# Patient Record
Sex: FEMALE | Race: WHITE | Employment: OTHER | URBAN - METROPOLITAN AREA
[De-identification: names, ages, dates, MRNs, and addresses within clinical notes are randomized per-mention and may not be internally consistent; named-entity substitution may affect disease eponyms.]

---

## 2017-01-26 ENCOUNTER — OFFICE VISIT (OUTPATIENT)
Dept: WOUND CARE | Facility: HOSPITAL | Age: 82
End: 2017-01-26
Attending: NURSE PRACTITIONER
Payer: MEDICARE

## 2017-01-26 DIAGNOSIS — I87.332 CHRONIC VENOUS HYPERTENSION (IDIOPATHIC) WITH ULCER AND INFLAMMATION OF LEFT LOWER EXTREMITY (HCC): Primary | ICD-10-CM

## 2017-01-26 DIAGNOSIS — L97.929 CHRONIC VENOUS HYPERTENSION (IDIOPATHIC) WITH ULCER AND INFLAMMATION OF LEFT LOWER EXTREMITY (HCC): Primary | ICD-10-CM

## 2017-01-26 DIAGNOSIS — L97.322 NON-PRESSURE CHRONIC ULCER OF LEFT ANKLE WITH FAT LAYER EXPOSED (HCC): ICD-10-CM

## 2017-01-26 PROCEDURE — 29581 APPL MULTLAYER CMPRN SYS LEG: CPT

## 2017-01-26 PROCEDURE — 99214 OFFICE O/P EST MOD 30 MIN: CPT

## 2017-01-26 NOTE — PROGRESS NOTES
Chief Complaint  This information was obtained from the patient  Patient presents for initial visit. Discovered wound. Not sure when. Has been seen by a couple doctors and is putting a salve on (not sure of name) and a bandage daily but today was only day. Constitutional Symptoms (General Health):  Fatigue, Fever, Loss of Appetite, Marked Weight Change, Night Sweats, Chills  Respiratory: Cough, Shortness of Breath  Cardiovascular (Central/Peripheral): Chest Pain, Dyspnea on Exertion, Edema, Intermittent Alexei Heart rhythm and rate regular, without murmur or gallop. dp/pt palpable bilaterally, strong pulsatile signals the the dp/pt/distal hallux bilaterally. Extremities + for varicosities, hemosideran staining L>R, atrophie shanti on the left, minimal edema.  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 pcp is dr. Giovanny Womack Protein: Meats, beans, eggs, milk and yogurt particularly [place] yogurt), tofu, soy nuts, soy protein products  Vitamin C: Citrus fruits and juices, strawberries, tomatoes, tomato juice, peppers, baked potatoes, spinach, broccoli, cauliflower, [place] spro

## 2017-01-30 ENCOUNTER — HOSPITAL ENCOUNTER (OUTPATIENT)
Dept: CARDIOLOGY CLINIC | Facility: HOSPITAL | Age: 82
Discharge: HOME | End: 2017-01-30
Attending: NURSE PRACTITIONER
Payer: MEDICARE

## 2017-01-30 ENCOUNTER — OFFICE VISIT (OUTPATIENT)
Dept: WOUND CARE | Facility: HOSPITAL | Age: 82
End: 2017-01-30
Attending: NURSE PRACTITIONER
Payer: MEDICARE

## 2017-01-30 DIAGNOSIS — L97.929 CHRONIC VENOUS HYPERTENSION (IDIOPATHIC) WITH ULCER AND INFLAMMATION OF LEFT LOWER EXTREMITY (HCC): ICD-10-CM

## 2017-01-30 DIAGNOSIS — I87.312 IDIOPATHIC CHRONIC VENOUS HYPERTENSION OF LEFT LOWER EXTREMITY WITH ULCER (HCC): ICD-10-CM

## 2017-01-30 DIAGNOSIS — I73.9 PERIPHERAL VASCULAR DISEASE, UNSPECIFIED (HCC): ICD-10-CM

## 2017-01-30 DIAGNOSIS — I73.9 PERIPHERAL VASCULAR DISEASE, UNSPECIFIED (HCC): Primary | ICD-10-CM

## 2017-01-30 DIAGNOSIS — L97.929 IDIOPATHIC CHRONIC VENOUS HYPERTENSION OF LEFT LOWER EXTREMITY WITH ULCER (HCC): ICD-10-CM

## 2017-01-30 DIAGNOSIS — I87.332 CHRONIC VENOUS HYPERTENSION (IDIOPATHIC) WITH ULCER AND INFLAMMATION OF LEFT LOWER EXTREMITY (HCC): ICD-10-CM

## 2017-01-30 PROCEDURE — 93970 EXTREMITY STUDY: CPT

## 2017-01-30 PROCEDURE — 29581 APPL MULTLAYER CMPRN SYS LEG: CPT

## 2017-02-01 ENCOUNTER — OFFICE VISIT (OUTPATIENT)
Dept: WOUND CARE | Age: 82
End: 2017-02-01
Attending: NURSE PRACTITIONER
Payer: MEDICARE

## 2017-02-01 DIAGNOSIS — L97.929 IDIOPATHIC CHRONIC VENOUS HYPERTENSION OF LEFT LOWER EXTREMITY WITH ULCER (HCC): ICD-10-CM

## 2017-02-01 DIAGNOSIS — L97.929 CHRONIC VENOUS HYPERTENSION (IDIOPATHIC) WITH ULCER AND INFLAMMATION OF LEFT LOWER EXTREMITY (HCC): ICD-10-CM

## 2017-02-01 DIAGNOSIS — I87.332 CHRONIC VENOUS HYPERTENSION (IDIOPATHIC) WITH ULCER AND INFLAMMATION OF LEFT LOWER EXTREMITY (HCC): ICD-10-CM

## 2017-02-01 DIAGNOSIS — I87.312 IDIOPATHIC CHRONIC VENOUS HYPERTENSION OF LEFT LOWER EXTREMITY WITH ULCER (HCC): ICD-10-CM

## 2017-02-01 DIAGNOSIS — I73.9 PERIPHERAL VASCULAR DISEASE, UNSPECIFIED (HCC): Primary | ICD-10-CM

## 2017-02-01 PROCEDURE — 29581 APPL MULTLAYER CMPRN SYS LEG: CPT

## 2017-02-01 NOTE — PROGRESS NOTES
Chief Complaint  This information was obtained from the patient  Patient is here for a follow up. No concerns or complaints at this time. Patient pain level is 6/10    General Notes:  Patint came into the wound center with her wrap off.  Patient only had un Cardiovascular (Central/Peripheral): Chest Pain, Dyspnea on Exertion, Edema, Intermittent Claudication  Gastrointestinal (GI): Bowel Incontinence, Change in Bowel Habits, Constipation  Musculoskeletal: Contractures, Assistive Devices  Integumentary (Hair/S Wound #1 Left, Medial Foot is a Full Thickness Venous Ulcer and has received a status of Not Healed. Subsequent wound encounter measurements are 1.6cm length x 2.1cm width x 0.1cm depth, with an area of 3.36 sq cm and a volume of 0.336 cubic cm.  No tunneli May shower with protection.  - with each dressing and compression wrap change cleanse the limb, foot, and between toes with soap and water and dry thoroughly  Cleanse with Vashe  Steroid cream/ointment - betamethasone around the wound  Zinc Barrier ointment Follow-Up Appointments:  A follow-up appointment should be scheduled. discussed the results of study with patient and gave her copy.  explained taht we will continue tx the wound but she needs to f/u with vascular-number given, update given to vascul

## 2017-02-03 ENCOUNTER — OFFICE VISIT (OUTPATIENT)
Dept: WOUND CARE | Facility: HOSPITAL | Age: 82
End: 2017-02-03
Attending: NURSE PRACTITIONER
Payer: MEDICARE

## 2017-02-03 DIAGNOSIS — L97.929 IDIOPATHIC CHRONIC VENOUS HYPERTENSION OF LEFT LOWER EXTREMITY WITH ULCER (HCC): ICD-10-CM

## 2017-02-03 DIAGNOSIS — I87.312 IDIOPATHIC CHRONIC VENOUS HYPERTENSION OF LEFT LOWER EXTREMITY WITH ULCER (HCC): ICD-10-CM

## 2017-02-03 DIAGNOSIS — I73.9 PERIPHERAL VASCULAR DISEASE, UNSPECIFIED (HCC): Primary | ICD-10-CM

## 2017-02-03 DIAGNOSIS — I87.332 CHRONIC VENOUS HYPERTENSION (IDIOPATHIC) WITH ULCER AND INFLAMMATION OF LEFT LOWER EXTREMITY (HCC): ICD-10-CM

## 2017-02-03 DIAGNOSIS — L97.929 CHRONIC VENOUS HYPERTENSION (IDIOPATHIC) WITH ULCER AND INFLAMMATION OF LEFT LOWER EXTREMITY (HCC): ICD-10-CM

## 2017-02-03 PROCEDURE — 29581 APPL MULTLAYER CMPRN SYS LEG: CPT

## 2017-02-09 ENCOUNTER — OFFICE VISIT (OUTPATIENT)
Dept: WOUND CARE | Facility: HOSPITAL | Age: 82
End: 2017-02-09
Attending: NURSE PRACTITIONER
Payer: MEDICARE

## 2017-02-09 DIAGNOSIS — I87.312 IDIOPATHIC CHRONIC VENOUS HYPERTENSION OF LEFT LOWER EXTREMITY WITH ULCER (HCC): ICD-10-CM

## 2017-02-09 DIAGNOSIS — I87.332 CHRONIC VENOUS HYPERTENSION (IDIOPATHIC) WITH ULCER AND INFLAMMATION OF LEFT LOWER EXTREMITY (HCC): ICD-10-CM

## 2017-02-09 DIAGNOSIS — I73.9 PERIPHERAL VASCULAR DISEASE, UNSPECIFIED (HCC): Primary | ICD-10-CM

## 2017-02-09 DIAGNOSIS — L97.929 CHRONIC VENOUS HYPERTENSION (IDIOPATHIC) WITH ULCER AND INFLAMMATION OF LEFT LOWER EXTREMITY (HCC): ICD-10-CM

## 2017-02-09 DIAGNOSIS — L97.929 IDIOPATHIC CHRONIC VENOUS HYPERTENSION OF LEFT LOWER EXTREMITY WITH ULCER (HCC): ICD-10-CM

## 2017-02-09 PROCEDURE — 29581 APPL MULTLAYER CMPRN SYS LEG: CPT

## 2017-02-09 PROCEDURE — 97602 WOUND(S) CARE NON-SELECTIVE: CPT

## 2017-02-09 NOTE — PROGRESS NOTES
Chief Complaint  This information was obtained from the patient  Patient here for wound care f/u. Patient has 5/10 for pain.      Allergies  Dexilant (Reaction: diarrhea)    HPI  This information was obtained from the patient  INITIAL 1-26-17 patient is a 9 Psychiatric: Anxiety, Claustrophobia, Memory Loss, Depression, Mental Illness    Weekly Nutrition Assessment    Weekly Nutrition Assessment  Does Patient describe adequate dietary intake?: Yes  4 servings protein daily?: Yes  5 servings fruit/veg daily? : The periwound skin exhibited: Moist, Atrophie Jocelyn, Hemosiderosis. The periwound skin did not exhibit: Edema, Dry/Scaly. The temperature of the periwound skin is Warm. Periwound skin does not exhibit signs or symptoms of infection.  Local Pulse is Normal Hydrofera ready  ABD pad  Moisturizer to surrounding skin.   Change dressing every: - twice weekly (if patient is able to get to clinic) for the first week, then weekly    Compression Therapy:  Coflex calamine unna boot  Avoid prolonged standing in one plac no progress at this time, will switch to santyl, awaiting venous px with dr. Seferino Wood.  will order compression stockings so that patient potentially can care for her wound indepdently until venous px as patient has to depend on family for visits to wound cent

## 2017-02-13 ENCOUNTER — OFFICE VISIT (OUTPATIENT)
Dept: WOUND CARE | Facility: HOSPITAL | Age: 82
End: 2017-02-13
Attending: NURSE PRACTITIONER
Payer: MEDICARE

## 2017-02-13 DIAGNOSIS — L97.929 CHRONIC VENOUS HYPERTENSION (IDIOPATHIC) WITH ULCER AND INFLAMMATION OF LEFT LOWER EXTREMITY (HCC): ICD-10-CM

## 2017-02-13 DIAGNOSIS — I87.332 CHRONIC VENOUS HYPERTENSION (IDIOPATHIC) WITH ULCER AND INFLAMMATION OF LEFT LOWER EXTREMITY (HCC): ICD-10-CM

## 2017-02-13 DIAGNOSIS — L97.929 IDIOPATHIC CHRONIC VENOUS HYPERTENSION OF LEFT LOWER EXTREMITY WITH ULCER (HCC): ICD-10-CM

## 2017-02-13 DIAGNOSIS — I73.9 PERIPHERAL VASCULAR DISEASE, UNSPECIFIED (HCC): Primary | ICD-10-CM

## 2017-02-13 DIAGNOSIS — I87.312 IDIOPATHIC CHRONIC VENOUS HYPERTENSION OF LEFT LOWER EXTREMITY WITH ULCER (HCC): ICD-10-CM

## 2017-02-13 PROCEDURE — 29581 APPL MULTLAYER CMPRN SYS LEG: CPT

## 2017-02-13 PROCEDURE — 97602 WOUND(S) CARE NON-SELECTIVE: CPT

## 2017-02-16 ENCOUNTER — OFFICE VISIT (OUTPATIENT)
Dept: WOUND CARE | Facility: HOSPITAL | Age: 82
End: 2017-02-16
Attending: NURSE PRACTITIONER
Payer: MEDICARE

## 2017-02-16 DIAGNOSIS — I87.332 CHRONIC VENOUS HYPERTENSION (IDIOPATHIC) WITH ULCER AND INFLAMMATION OF LEFT LOWER EXTREMITY (HCC): ICD-10-CM

## 2017-02-16 DIAGNOSIS — I87.312 IDIOPATHIC CHRONIC VENOUS HYPERTENSION OF LEFT LOWER EXTREMITY WITH ULCER (HCC): ICD-10-CM

## 2017-02-16 DIAGNOSIS — L97.929 CHRONIC VENOUS HYPERTENSION (IDIOPATHIC) WITH ULCER AND INFLAMMATION OF LEFT LOWER EXTREMITY (HCC): ICD-10-CM

## 2017-02-16 DIAGNOSIS — L97.929 IDIOPATHIC CHRONIC VENOUS HYPERTENSION OF LEFT LOWER EXTREMITY WITH ULCER (HCC): ICD-10-CM

## 2017-02-16 DIAGNOSIS — I73.9 PERIPHERAL VASCULAR DISEASE, UNSPECIFIED (HCC): Primary | ICD-10-CM

## 2017-02-16 PROCEDURE — 29581 APPL MULTLAYER CMPRN SYS LEG: CPT

## 2017-02-17 NOTE — PROGRESS NOTES
Chief Complaint  This information was obtained from the patient  Pt arrives for follow up care to left foot venous ulcer. Pt states wrap is too tight waking her up at night. She states she is not able to ever elevate her leg above her heart d/t back pain. Musculoskeletal: Joint Pain (osteoarthritis), Backache (kyphosis with neck pain)  Integumentary (Hair/Skin/Nails): Hyperpigmentation, Ulcers, Hemosiderin Staining  Psychiatric: Nervousness / Tension    Patient denies complaints or symptoms related to:    Co see wound documentation. Skin and subcutaneous tissue without induration, bogginess, errythema or crepitus . Wound #1 Left, Medial Foot is a Full Thickness Venous Ulcer and has received a status of Not Healed.  Subsequent wound encounter measurements ar Wound #1 (Venous Ulcer) is located on the left, medial foot. A Multi-Layer Compression Wrap procedure was performed by Francella Peabody, MA. A 2 Layers Unna Boot was applied with moderate 15-25 mmhg. The procedure was tolerated well.    General Notes:  unna 2) bring your stockings that you have to your next appointment and we will assess them and see if they will be appropriate    Care summary  Wound type: - venous  Wound no change. No s/s of infection. Perfusion assessed by ESTRELLITA/TBI - bedside-l=. 92 and righ

## 2017-02-20 ENCOUNTER — OFFICE VISIT (OUTPATIENT)
Dept: WOUND CARE | Facility: HOSPITAL | Age: 82
End: 2017-02-20
Attending: NURSE PRACTITIONER
Payer: MEDICARE

## 2017-02-20 DIAGNOSIS — I73.9 PERIPHERAL VASCULAR DISEASE, UNSPECIFIED (HCC): ICD-10-CM

## 2017-02-20 DIAGNOSIS — I87.312 IDIOPATHIC CHRONIC VENOUS HYPERTENSION OF LEFT LOWER EXTREMITY WITH ULCER (HCC): Primary | ICD-10-CM

## 2017-02-20 DIAGNOSIS — L97.929 IDIOPATHIC CHRONIC VENOUS HYPERTENSION OF LEFT LOWER EXTREMITY WITH ULCER (HCC): Primary | ICD-10-CM

## 2017-02-20 PROCEDURE — 29581 APPL MULTLAYER CMPRN SYS LEG: CPT

## 2017-02-23 ENCOUNTER — HOSPITAL ENCOUNTER (EMERGENCY)
Facility: HOSPITAL | Age: 82
Discharge: HOME | End: 2017-02-23
Attending: EMERGENCY MEDICINE
Payer: MEDICARE

## 2017-02-23 ENCOUNTER — OFFICE VISIT (OUTPATIENT)
Dept: WOUND CARE | Facility: HOSPITAL | Age: 82
End: 2017-02-23
Attending: NURSE PRACTITIONER
Payer: MEDICARE

## 2017-02-23 VITALS
RESPIRATION RATE: 16 BRPM | DIASTOLIC BLOOD PRESSURE: 79 MMHG | SYSTOLIC BLOOD PRESSURE: 171 MMHG | HEART RATE: 71 BPM | TEMPERATURE: 99 F | OXYGEN SATURATION: 99 % | BODY MASS INDEX: 18.55 KG/M2 | WEIGHT: 92 LBS | HEIGHT: 59 IN

## 2017-02-23 DIAGNOSIS — I73.9 PERIPHERAL VASCULAR DISEASE, UNSPECIFIED (HCC): ICD-10-CM

## 2017-02-23 DIAGNOSIS — I87.332 CHRONIC VENOUS HYPERTENSION (IDIOPATHIC) WITH ULCER AND INFLAMMATION OF LEFT LOWER EXTREMITY (HCC): ICD-10-CM

## 2017-02-23 DIAGNOSIS — L97.929 IDIOPATHIC CHRONIC VENOUS HYPERTENSION OF LEFT LOWER EXTREMITY WITH ULCER (HCC): Primary | ICD-10-CM

## 2017-02-23 DIAGNOSIS — I10 ESSENTIAL HYPERTENSION: Primary | ICD-10-CM

## 2017-02-23 DIAGNOSIS — L97.929 CHRONIC VENOUS HYPERTENSION (IDIOPATHIC) WITH ULCER AND INFLAMMATION OF LEFT LOWER EXTREMITY (HCC): ICD-10-CM

## 2017-02-23 DIAGNOSIS — I87.312 IDIOPATHIC CHRONIC VENOUS HYPERTENSION OF LEFT LOWER EXTREMITY WITH ULCER (HCC): Primary | ICD-10-CM

## 2017-02-23 LAB
ATRIAL RATE: 79 BPM
P AXIS: 93 DEGREES
P-R INTERVAL: 242 MS
Q-T INTERVAL: 406 MS
QRS DURATION: 80 MS
QTC CALCULATION (BEZET): 465 MS
R AXIS: -11 DEGREES
T AXIS: 73 DEGREES
VENTRICULAR RATE: 79 BPM

## 2017-02-23 PROCEDURE — 99284 EMERGENCY DEPT VISIT MOD MDM: CPT

## 2017-02-23 PROCEDURE — 93010 ELECTROCARDIOGRAM REPORT: CPT

## 2017-02-23 PROCEDURE — 99213 OFFICE O/P EST LOW 20 MIN: CPT

## 2017-02-23 PROCEDURE — 29581 APPL MULTLAYER CMPRN SYS LEG: CPT

## 2017-02-23 PROCEDURE — 93005 ELECTROCARDIOGRAM TRACING: CPT

## 2017-02-23 RX ORDER — AMLODIPINE BESYLATE 5 MG/1
5 TABLET ORAL ONCE
Status: COMPLETED | OUTPATIENT
Start: 2017-02-23 | End: 2017-02-23

## 2017-02-23 RX ORDER — AMLODIPINE BESYLATE 2.5 MG/1
2.5 TABLET ORAL DAILY
Qty: 30 TABLET | Refills: 0 | Status: SHIPPED | OUTPATIENT
Start: 2017-02-23 | End: 2017-03-07

## 2017-02-23 NOTE — PROGRESS NOTES
Chief Complaint  This information was obtained from the patient  Pt is here for follow up care to left foot venous ulcer. Pt complains of headache since this morning. Denies pain on the wound.     Allergies  Dexilant (Reaction: diarrhea)    HPI  This informa Psychiatric: Nervousness / Tension    Patient denies complaints or symptoms related to:   Constitutional Symptoms (General Health):  Fatigue, Fever, Loss of Appetite, Marked Weight Change, Night Sweats, Chills  Respiratory: Cough, Shortness of Breath  Cardi see wound documentation. Skin and subcutaneous tissue without induration, bogginess, errythema or crepitus . Wound #1 Left, Medial Foot is a Full Thickness Venous Ulcer and has received a status of Not Healed.  Subsequent wound encounter measurements ar I10: Essential (primary) hypertension  General Notes:  I spent 25 minutes with the patient, I personally checked patient in, measured the wound, and discussed transportation to ED. See plan.  More than 50% of that time was face to face and spent counseling Other: - bring your stockings that you have to your next appointment and to Dr. Faisal Swain office and we will assess them and see if they will be appropriate    Care summary  Wound type: - venous  Wound no change. No s/s of infection.    Perfusion assessed by A

## 2017-02-23 NOTE — ED NOTES
Pt appears comfortable on cart. Pt informed she is ready for discharge. Pt to call son for ride home.

## 2017-02-23 NOTE — ED PROVIDER NOTES
Patient Seen in: [facility] Emergency Department    History   Patient presents with:  Hypertension (cardiovascular)    Stated Complaint: hypertension    HPI    Patient is a 80-year-old female comes in emergency room for evaluation of elevated blood Apply to rectal area twice daily as needed   Cholecalciferol (VITAMIN D3) 1000 UNIT Oral Tab,  1 TABLET DAILY   CALCIUM + D 600-200 MG-UNIT OR TABS,  2 TABLETS DAILY       No family history on file.       Smoking Status: Never Smoker                      Sm intact all groups. normal sensation, speech intact    ED Course     Labs Reviewed   RAINBOW DRAW BLUE   RAINBOW DRAW GOLD   RAINBOW DRAW LAVENDER   RAINBOW DRAW LIGHT GREEN      EKG    Rate, intervals and axes as noted on EKG Report.   Rate: 79  Rhythm: Si

## 2017-02-23 NOTE — ED INITIAL ASSESSMENT (HPI)
Sent from wound clinic for hypertension. Patient reports she does not take any medication for BP. Patient states she noticed headache this morning over right eye. Has not taken anything for headache.

## 2017-02-28 ENCOUNTER — OFFICE VISIT (OUTPATIENT)
Dept: WOUND CARE | Facility: HOSPITAL | Age: 82
End: 2017-02-28
Attending: NURSE PRACTITIONER
Payer: MEDICARE

## 2017-02-28 DIAGNOSIS — I87.332 CHRONIC VENOUS HYPERTENSION (IDIOPATHIC) WITH ULCER AND INFLAMMATION OF LEFT LOWER EXTREMITY (HCC): Primary | ICD-10-CM

## 2017-02-28 DIAGNOSIS — L97.929 CHRONIC VENOUS HYPERTENSION (IDIOPATHIC) WITH ULCER AND INFLAMMATION OF LEFT LOWER EXTREMITY (HCC): Primary | ICD-10-CM

## 2017-02-28 DIAGNOSIS — L97.322 NON-PRESSURE CHRONIC ULCER OF LEFT ANKLE WITH FAT LAYER EXPOSED (HCC): ICD-10-CM

## 2017-02-28 PROCEDURE — 29581 APPL MULTLAYER CMPRN SYS LEG: CPT

## 2017-03-02 ENCOUNTER — OFFICE VISIT (OUTPATIENT)
Dept: WOUND CARE | Facility: HOSPITAL | Age: 82
End: 2017-03-02
Attending: NURSE PRACTITIONER
Payer: MEDICARE

## 2017-03-02 PROCEDURE — 29581 APPL MULTLAYER CMPRN SYS LEG: CPT

## 2017-03-02 NOTE — PROGRESS NOTES
Chief Complaint  This information was obtained from the patient  Patient is here for left foot venous ulcer. She went to see Dr. Birdie Kelley, who was late for appointment, he did not do procedure because she did not have her compression stockings with her.  She Psychiatric: Memory Loss (patient is forgetting things that we have told her and written on her dc orders she also is claiming she did not get any injection at the dr. Zac Tapia visit.), Nervousness / Tension    Patient denies complaints or symptoms related t dp/pt palpable left. left lower extremity + for varicosities, hemosideran staining L>R, atrophie shanti on the left, minimal edema. Capillary refill < 3 seconds. Digits are warm.  toenails are wnl for color and hygeine, slightly thickned and long. + sparse (Encounter Diagnosis) I10 - Essential (primary) hypertension    Diagnoses    ICD-10  I87.332: Chronic venous hypertension (idiopathic) with ulcer and inflammation of left lower extremity  L97.322: Non-pressure chronic ulcer of left ankle with fat layer exp Vitamin C: Citrus fruits and juices, strawberries, tomatoes, tomato juice, peppers, baked potatoes, spinach, broccoli, cauliflower, [place] sprouts, cabbage  Vitamin A: Dark green, leafy vegetables, orange or yellow vegetables, cantaloupe, fortified dairy 3) they are requesting the records to be faxed to [facility] which we will do.   4) since the daughter \"came for 2 weeks from [facility] [place] [facility] her mother\" I am recommending that the daughter please assist and transport the patient to a PCP specifically regardin

## 2017-03-07 PROBLEM — I10 ESSENTIAL HYPERTENSION: Status: ACTIVE | Noted: 2017-03-07

## 2017-03-07 PROCEDURE — 36415 COLL VENOUS BLD VENIPUNCTURE: CPT | Performed by: FAMILY MEDICINE

## 2017-03-07 PROCEDURE — 84443 ASSAY THYROID STIM HORMONE: CPT | Performed by: FAMILY MEDICINE

## 2017-03-09 ENCOUNTER — OFFICE VISIT (OUTPATIENT)
Dept: WOUND CARE | Facility: HOSPITAL | Age: 82
End: 2017-03-09
Attending: NURSE PRACTITIONER
Payer: MEDICARE

## 2017-03-09 DIAGNOSIS — I87.312 IDIOPATHIC CHRONIC VENOUS HYPERTENSION OF LEFT LOWER EXTREMITY WITH ULCER (HCC): ICD-10-CM

## 2017-03-09 DIAGNOSIS — L97.929 IDIOPATHIC CHRONIC VENOUS HYPERTENSION OF LEFT LOWER EXTREMITY WITH ULCER (HCC): ICD-10-CM

## 2017-03-09 DIAGNOSIS — I87.332 CHRONIC VENOUS HYPERTENSION (IDIOPATHIC) WITH ULCER AND INFLAMMATION OF LEFT LOWER EXTREMITY (HCC): Primary | ICD-10-CM

## 2017-03-09 DIAGNOSIS — L97.322 NON-PRESSURE CHRONIC ULCER OF LEFT ANKLE WITH FAT LAYER EXPOSED (HCC): ICD-10-CM

## 2017-03-09 DIAGNOSIS — L97.929 CHRONIC VENOUS HYPERTENSION (IDIOPATHIC) WITH ULCER AND INFLAMMATION OF LEFT LOWER EXTREMITY (HCC): Primary | ICD-10-CM

## 2017-03-09 PROCEDURE — 29581 APPL MULTLAYER CMPRN SYS LEG: CPT

## 2017-03-09 NOTE — PROGRESS NOTES
Header Image    Progress Note Details  Patient Name: Bridgette Jones   Patient Number: [id number]  Patient YOB: 1924  Date: 3/9/2017  Physician / Brian Bank: Jesus Manuel Her, [address]: [facility]    Chief Complaint  This information was 3/9/17-Pt follows up with PCP for high blood pressure. Will continue same dressing at this time and patient will continue to follow up with  . Appointment at [facility] cancelled per daughter.     Complaints and Symptoms  This information was obtained fr bp elevated, patient denies symptoms of headache, dizziness, sob, or vision changes, instructed to check at home. will continue to monitor. Pulse Regular and wnl for patient. Jose Ramon Garcia Respirations easy and unlabored. Temperature wnl. Weight normal for height. Melons  Perla wound bed edges are difficult to assess due to the atrophie shanti surrounding the wound    Psychiatric:  judgement and insight is intact, however assistance by family for medical decision-making. Alert and oriented times 3.  No evidence of depression, anx Increase dietary protein - focus on the following foods:  Protein: Meats, beans, eggs, milk and yogurt particularly [place] yogurt), tofu, soy nuts, soy protein products  Vitamin C: Citrus fruits and juices, strawberries, tomatoes, tomato juice, peppers, [place]

## 2017-03-16 ENCOUNTER — OFFICE VISIT (OUTPATIENT)
Dept: WOUND CARE | Facility: HOSPITAL | Age: 82
End: 2017-03-16
Attending: NURSE PRACTITIONER
Payer: MEDICARE

## 2017-03-16 DIAGNOSIS — L97.322 NON-PRESSURE CHRONIC ULCER OF LEFT ANKLE WITH FAT LAYER EXPOSED (HCC): ICD-10-CM

## 2017-03-16 DIAGNOSIS — L97.929 IDIOPATHIC CHRONIC VENOUS HYPERTENSION OF LEFT LOWER EXTREMITY WITH ULCER (HCC): ICD-10-CM

## 2017-03-16 DIAGNOSIS — L97.929 CHRONIC VENOUS HYPERTENSION (IDIOPATHIC) WITH ULCER AND INFLAMMATION OF LEFT LOWER EXTREMITY (HCC): Primary | ICD-10-CM

## 2017-03-16 DIAGNOSIS — I87.312 IDIOPATHIC CHRONIC VENOUS HYPERTENSION OF LEFT LOWER EXTREMITY WITH ULCER (HCC): ICD-10-CM

## 2017-03-16 DIAGNOSIS — I87.332 CHRONIC VENOUS HYPERTENSION (IDIOPATHIC) WITH ULCER AND INFLAMMATION OF LEFT LOWER EXTREMITY (HCC): Primary | ICD-10-CM

## 2017-03-16 PROCEDURE — 29581 APPL MULTLAYER CMPRN SYS LEG: CPT

## 2017-03-16 NOTE — PROGRESS NOTES
&   Chief Complaint  This information was obtained from the patient  Patient is here for a wound care follow up with left foot wound. She denies any pain.     Allergies  Dexilant (Reaction: diarrhea)    HPI  This information was obtained from the patient  I Constitutional Symptoms (General Health):  Fatigue, Fever, Loss of Appetite, Marked Weight Change, Night Sweats, Chills  Respiratory: Cough, Shortness of Breath  Cardiovascular (Central/Peripheral): Chest Pain, Dyspnea on Exertion, Edema  Musculoskeletal: C Wound #1 Left, Medial Foot is a Full Thickness Venous Ulcer and has received a status of Not Healed. Subsequent wound encounter measurements are 2.3cm length x 1.4cm width x 0.1cm depth, with an area of 3.22 sq cm and a volume of 0.322 cubic cm.  No tunneli Wound #1 Left, Medial Foot     Wound Cleansing & Dressings  May shower with protection.  - with each dressing and compression wrap change cleanse the limb, foot, and between toes with soap and water and dry thoroughly  Steroid cream/ointment - betamethasone Electronic Signature(s)   Signed By:  Date:    Lisa SIEGEL, ÁNGELA-AP, CFCN, [facility] 03/16/2017 09:26:09        Entered By: Lisa Hill on 03/16/2017 09:25:52

## 2017-03-20 ENCOUNTER — OFFICE VISIT (OUTPATIENT)
Dept: WOUND CARE | Facility: HOSPITAL | Age: 82
End: 2017-03-20
Attending: NURSE PRACTITIONER
Payer: MEDICARE

## 2017-03-20 DIAGNOSIS — I73.9 PERIPHERAL VASCULAR DISEASE, UNSPECIFIED (HCC): ICD-10-CM

## 2017-03-20 DIAGNOSIS — L97.322 NON-PRESSURE CHRONIC ULCER OF LEFT ANKLE WITH FAT LAYER EXPOSED (HCC): ICD-10-CM

## 2017-03-20 DIAGNOSIS — L97.929 IDIOPATHIC CHRONIC VENOUS HYPERTENSION OF LEFT LOWER EXTREMITY WITH ULCER (HCC): ICD-10-CM

## 2017-03-20 DIAGNOSIS — I87.312 IDIOPATHIC CHRONIC VENOUS HYPERTENSION OF LEFT LOWER EXTREMITY WITH ULCER (HCC): ICD-10-CM

## 2017-03-20 DIAGNOSIS — I87.332 CHRONIC VENOUS HYPERTENSION (IDIOPATHIC) WITH ULCER AND INFLAMMATION OF LEFT LOWER EXTREMITY (HCC): Primary | ICD-10-CM

## 2017-03-20 DIAGNOSIS — L97.929 CHRONIC VENOUS HYPERTENSION (IDIOPATHIC) WITH ULCER AND INFLAMMATION OF LEFT LOWER EXTREMITY (HCC): Primary | ICD-10-CM

## 2017-03-20 PROCEDURE — 29581 APPL MULTLAYER CMPRN SYS LEG: CPT

## 2017-03-20 NOTE — PROGRESS NOTES
&   Chief Complaint  This information was obtained from the patient  Patient is here for a wound care follow up with left foot wound states she noted bleeding on her sheets yesterday. She was seen by a [facility] and dressing was not removed.      Allergies  Dexil (kyphosis with neck pain)  Integumentary (Hair/Skin/Nails): Ulcers, Hemosiderin Staining  Psychiatric: Nervousness / Tension  Prior Wound History: Bleeding (over the weekend)    Patient denies complaints or symptoms related to:   Constitutional Symptoms (G Venous Ulcer and has received a status of Not Healed. Subsequent wound encounter measurements are 2.7cm length x 2.1cm width x 0.2cm depth, with an area of 5.67 sq cm and a volume of 1.134 cubic cm. No tunneling has been noted.  No sinus tract has been note was applied with high 30-40 mmhg. The procedure was tolerated well. General Notes:  coflex 2 layer        Plan    Wound Orders:  Wound #1 Left, Medial Foot     Wound Cleansing & Dressings  May shower with protection.  - with each dressing and compression visualize where she was \"bleeding from\" from the weekend. patient has a f/u appt with dr. Lesa Mendez either this week or next. I will see her later this week.    Electronic Signature(s)   Signed By:  Date:    Geraldo SIEGEL, CWRAUL-AP, [facility], [address] [place] [address],[address], [facility] 03/

## 2017-03-23 ENCOUNTER — OFFICE VISIT (OUTPATIENT)
Dept: WOUND CARE | Facility: HOSPITAL | Age: 82
End: 2017-03-23
Attending: NURSE PRACTITIONER
Payer: MEDICARE

## 2017-03-23 DIAGNOSIS — L97.929 CHRONIC VENOUS HYPERTENSION (IDIOPATHIC) WITH ULCER AND INFLAMMATION OF LEFT LOWER EXTREMITY (HCC): ICD-10-CM

## 2017-03-23 DIAGNOSIS — I87.332 CHRONIC VENOUS HYPERTENSION (IDIOPATHIC) WITH ULCER AND INFLAMMATION OF LEFT LOWER EXTREMITY (HCC): ICD-10-CM

## 2017-03-23 DIAGNOSIS — T14.8XXA NONHEALING NONSURGICAL WOUND: Primary | ICD-10-CM

## 2017-03-23 DIAGNOSIS — L97.322 NON-PRESSURE CHRONIC ULCER OF LEFT ANKLE WITH FAT LAYER EXPOSED (HCC): ICD-10-CM

## 2017-03-23 DIAGNOSIS — I73.9 PERIPHERAL VASCULAR DISEASE, UNSPECIFIED (HCC): ICD-10-CM

## 2017-03-23 PROCEDURE — 87070 CULTURE OTHR SPECIMN AEROBIC: CPT

## 2017-03-23 PROCEDURE — 29581 APPL MULTLAYER CMPRN SYS LEG: CPT

## 2017-03-23 PROCEDURE — 87205 SMEAR GRAM STAIN: CPT

## 2017-03-23 NOTE — PROGRESS NOTES
&   Chief Complaint  This information was obtained from the patient  Patient is here for a wound care follow up on left foot no concerns    Allergies  Dexilant (Reaction: diarrhea)    HPI  This information was obtained from the patient  INITIAL 1-26-17 pat Constitutional Symptoms (General Health):  Fatigue, Fever, Loss of Appetite, Marked Weight Change, Night Sweats, Chills  Respiratory: Cough, Shortness of Breath  Cardiovascular (Central/Peripheral): Chest Pain, Dyspnea on Exertion, Edema  Musculoskeletal: C Wound #1 Left, Medial Foot is a Full Thickness Venous Ulcer and has received a status of Not Healed. Subsequent wound encounter measurements are 3cm length x 1.8cm width x 0.1cm depth, with an area of 5.4 sq cm and a volume of 0.54 cubic cm.  No tunneling h I spent 25 minutes with the patient. See plan. More than 50% of that time was face to face and spent counseling the patient and/or on coordination of care.  The diagnosis, prognosis, and general treatment was explained to the patient        Procedures    Wo Perfusion assessed by doppler. - strong pulsatile signals at the dp/pt/distal hallux bilaterally  Perfusion assessed by palpation of pulses. Venous/Vascular consult: - DR. ANGEL [facility].  px done on 2-28   Last albumin date and value: - June 2016 a

## 2017-03-27 ENCOUNTER — OFFICE VISIT (OUTPATIENT)
Dept: WOUND CARE | Facility: HOSPITAL | Age: 82
End: 2017-03-27
Attending: NURSE PRACTITIONER
Payer: MEDICARE

## 2017-03-27 DIAGNOSIS — L97.929 CHRONIC VENOUS HYPERTENSION (IDIOPATHIC) WITH ULCER AND INFLAMMATION OF LEFT LOWER EXTREMITY (HCC): Primary | ICD-10-CM

## 2017-03-27 DIAGNOSIS — I73.9 PERIPHERAL VASCULAR DISEASE, UNSPECIFIED (HCC): ICD-10-CM

## 2017-03-27 DIAGNOSIS — L97.322 NON-PRESSURE CHRONIC ULCER OF LEFT ANKLE WITH FAT LAYER EXPOSED (HCC): ICD-10-CM

## 2017-03-27 DIAGNOSIS — I87.332 CHRONIC VENOUS HYPERTENSION (IDIOPATHIC) WITH ULCER AND INFLAMMATION OF LEFT LOWER EXTREMITY (HCC): Primary | ICD-10-CM

## 2017-03-27 PROCEDURE — 29581 APPL MULTLAYER CMPRN SYS LEG: CPT

## 2017-03-27 NOTE — PROGRESS NOTES
&   Chief Complaint  This information was obtained from the patient  Patient is here for a wound care follow up on left foot. She states that her wound was very painful last night however the pain has subsided today.     Allergies  Dexilant (Reaction: diarr Prior Wound History: Pain (improved today), Drainage    Patient denies complaints or symptoms related to:   Constitutional Symptoms (General Health):  Fatigue, Fever, Loss of Appetite, Marked Weight Change, Night Sweats, Chills  Respiratory: Cough, Shortnes Wound #1 Left, Medial Foot is a Full Thickness Venous Ulcer and has received a status of Not Healed. Subsequent wound encounter measurements are 3cm length x 1.6cm width x 0.1cm depth, with an area of 4.8 sq cm and a volume of 0.48 cubic cm.  No tunneling h Wound #1 (Venous Ulcer) is located on the left, medial foot. A Multi-Layer Compression Wrap procedure was performed. A Multi-Layer was applied with high 30-40 mmhg. The procedure was tolerated well.    General Notes:  coflex 2 layer        Plan    Wound Dionna Dinga Venous/Vascular consult: - DR. ANGEL [facility].  px done on 2-28, F/U APPT ON 3-29  Last albumin date and value: - June 2016 albumin 4.6/TP7.6  No signs and symptoms of osteomyelitis present. - Jan 2017 xray negative    Follow-Up Appointments:  [facility] self

## 2017-03-29 ENCOUNTER — OFFICE VISIT (OUTPATIENT)
Dept: WOUND CARE | Facility: HOSPITAL | Age: 82
End: 2017-03-29
Attending: NURSE PRACTITIONER
Payer: MEDICARE

## 2017-03-29 DIAGNOSIS — L97.322 NON-PRESSURE CHRONIC ULCER OF LEFT ANKLE WITH FAT LAYER EXPOSED (HCC): ICD-10-CM

## 2017-03-29 DIAGNOSIS — I87.332 CHRONIC VENOUS HYPERTENSION (IDIOPATHIC) WITH ULCER AND INFLAMMATION OF LEFT LOWER EXTREMITY (HCC): Primary | ICD-10-CM

## 2017-03-29 DIAGNOSIS — I73.9 PERIPHERAL VASCULAR DISEASE, UNSPECIFIED (HCC): ICD-10-CM

## 2017-03-29 DIAGNOSIS — L97.929 CHRONIC VENOUS HYPERTENSION (IDIOPATHIC) WITH ULCER AND INFLAMMATION OF LEFT LOWER EXTREMITY (HCC): Primary | ICD-10-CM

## 2017-03-29 PROCEDURE — 29581 APPL MULTLAYER CMPRN SYS LEG: CPT

## 2017-04-03 ENCOUNTER — OFFICE VISIT (OUTPATIENT)
Dept: WOUND CARE | Facility: HOSPITAL | Age: 82
End: 2017-04-03
Attending: NURSE PRACTITIONER
Payer: MEDICARE

## 2017-04-03 DIAGNOSIS — I87.332 CHRONIC VENOUS HYPERTENSION (IDIOPATHIC) WITH ULCER AND INFLAMMATION OF LEFT LOWER EXTREMITY (HCC): Primary | ICD-10-CM

## 2017-04-03 DIAGNOSIS — L97.322 NON-PRESSURE CHRONIC ULCER OF LEFT ANKLE WITH FAT LAYER EXPOSED (HCC): ICD-10-CM

## 2017-04-03 DIAGNOSIS — I73.9 PERIPHERAL VASCULAR DISEASE, UNSPECIFIED (HCC): ICD-10-CM

## 2017-04-03 DIAGNOSIS — L97.929 CHRONIC VENOUS HYPERTENSION (IDIOPATHIC) WITH ULCER AND INFLAMMATION OF LEFT LOWER EXTREMITY (HCC): Primary | ICD-10-CM

## 2017-04-03 PROCEDURE — 29581 APPL MULTLAYER CMPRN SYS LEG: CPT

## 2017-04-03 NOTE — PROGRESS NOTES
&   Chief Complaint  This information was obtained from the patient  Patient is here for a wound care follow up on left medial foot. Patient states her wound is sore today. 5/10 for pain.     Allergies  Dexilant (Reaction: diarrhea)    HPI  This information Cardiovascular (Central/Peripheral):  Other (hx of varicose veins, venous insufficiency)  Musculoskeletal: Joint Pain (osteoarthritis), Backache (kyphosis with neck pain)  Integumentary (Hair/Skin/Nails): Ulcers, Hemosiderin Staining  Psychiatric: Nervousne dp/pt palpable left. left lower extremity + for varicosities, hemosideran staining L>R, atrophie shanti on the left, minimal edema. Capillary refill < 3 seconds. Digits are warm.  toenails are wnl for color and hygeine, slightly thickned and long. + sparse (Encounter Diagnosis) Z06.053 - Chronic venous hypertension (idiopathic) with ulcer and inflammation of left lower extremity  (Encounter Diagnosis) L97.322 - Non-pressure chronic ulcer of left ankle with fat layer exposed  (Encounter Diagnosis) I10 - [place] Vitamin C: Citrus fruits and juices, strawberries, tomatoes, tomato juice, peppers, baked potatoes, spinach, broccoli, cauliflower, [place] sprouts, cabbage  Vitamin A: Dark green, leafy vegetables, orange or yellow vegetables, cantaloupe, fortified dairy

## 2017-04-06 ENCOUNTER — OFFICE VISIT (OUTPATIENT)
Dept: WOUND CARE | Facility: HOSPITAL | Age: 82
End: 2017-04-06
Attending: NURSE PRACTITIONER
Payer: MEDICARE

## 2017-04-06 DIAGNOSIS — T14.8XXA NONHEALING NONSURGICAL WOUND: ICD-10-CM

## 2017-04-06 DIAGNOSIS — I73.9 PERIPHERAL VASCULAR DISEASE, UNSPECIFIED (HCC): ICD-10-CM

## 2017-04-06 DIAGNOSIS — L97.322 NON-PRESSURE CHRONIC ULCER OF LEFT ANKLE WITH FAT LAYER EXPOSED (HCC): Primary | ICD-10-CM

## 2017-04-06 PROCEDURE — 29581 APPL MULTLAYER CMPRN SYS LEG: CPT

## 2017-04-10 ENCOUNTER — OFFICE VISIT (OUTPATIENT)
Dept: WOUND CARE | Facility: HOSPITAL | Age: 82
End: 2017-04-10
Attending: NURSE PRACTITIONER
Payer: MEDICARE

## 2017-04-10 DIAGNOSIS — L97.322 NON-PRESSURE CHRONIC ULCER OF LEFT ANKLE WITH FAT LAYER EXPOSED (HCC): Primary | ICD-10-CM

## 2017-04-10 DIAGNOSIS — T14.8XXA NONHEALING NONSURGICAL WOUND: ICD-10-CM

## 2017-04-10 DIAGNOSIS — I73.9 PERIPHERAL VASCULAR DISEASE, UNSPECIFIED (HCC): ICD-10-CM

## 2017-04-10 PROCEDURE — 29581 APPL MULTLAYER CMPRN SYS LEG: CPT

## 2017-04-10 NOTE — PROGRESS NOTES
&   Chief Complaint  This information was obtained from the patient  Patient is here for a follow up of her left foot. She states she was having some pain yesterday to the wound area but today is [place] lot better. \" She states she \"was outside a lot and pic Prior Wound History: Pain (consistently the same-worse with wound open)    Patient denies complaints or symptoms related to:   Constitutional Symptoms (General Health):  Fatigue, Fever, Loss of Appetite, Marked Weight Change, Night Sweats, Chills  Respirato see wound documentation. Skin and subcutaneous tissue without induration, bogginess, errythema or crepitus . Wound #1 Left, Medial Foot is a Full Thickness Venous Ulcer and has received a status of Not Healed.  Subsequent wound encounter measurements ar I10: Essential (primary) hypertension        Plan    Wound Orders:  Wound #1 Left, Medial Foot     Wound Cleansing & Dressings  May shower with protection.  - with each dressing and compression wrap change cleanse the limb, foot, and between toes with soap 3-29 SCLEROtherapy not done, patient has f/u in 4 weeks.  patient is made aware that she will need these injections to heal her wound  Last albumin date and value: - June 2016 albumin 4.6/TP7.6  No signs and symptoms of osteomyelitis present. - Jan 2017 xra

## 2017-04-13 ENCOUNTER — OFFICE VISIT (OUTPATIENT)
Dept: WOUND CARE | Facility: HOSPITAL | Age: 82
End: 2017-04-13
Attending: NURSE PRACTITIONER
Payer: MEDICARE

## 2017-04-13 DIAGNOSIS — L97.322 NON-PRESSURE CHRONIC ULCER OF LEFT ANKLE WITH FAT LAYER EXPOSED (HCC): Primary | ICD-10-CM

## 2017-04-13 DIAGNOSIS — T14.8XXA NONHEALING NONSURGICAL WOUND: ICD-10-CM

## 2017-04-13 DIAGNOSIS — I73.9 PERIPHERAL VASCULAR DISEASE, UNSPECIFIED (HCC): ICD-10-CM

## 2017-04-13 PROCEDURE — 29581 APPL MULTLAYER CMPRN SYS LEG: CPT

## 2017-04-17 ENCOUNTER — OFFICE VISIT (OUTPATIENT)
Dept: WOUND CARE | Facility: HOSPITAL | Age: 82
End: 2017-04-17
Attending: NURSE PRACTITIONER
Payer: MEDICARE

## 2017-04-17 DIAGNOSIS — I73.9 PERIPHERAL VASCULAR DISEASE, UNSPECIFIED (HCC): ICD-10-CM

## 2017-04-17 DIAGNOSIS — I87.332 CHRONIC VENOUS HYPERTENSION (IDIOPATHIC) WITH ULCER AND INFLAMMATION OF LEFT LOWER EXTREMITY (HCC): ICD-10-CM

## 2017-04-17 DIAGNOSIS — L97.322 NON-PRESSURE CHRONIC ULCER OF LEFT ANKLE WITH FAT LAYER EXPOSED (HCC): Primary | ICD-10-CM

## 2017-04-17 DIAGNOSIS — L97.929 CHRONIC VENOUS HYPERTENSION (IDIOPATHIC) WITH ULCER AND INFLAMMATION OF LEFT LOWER EXTREMITY (HCC): ICD-10-CM

## 2017-04-17 PROCEDURE — 29581 APPL MULTLAYER CMPRN SYS LEG: CPT

## 2017-04-17 NOTE — PROGRESS NOTES
&   Chief Complaint  This information was obtained from the patient  Patient is here for a follow up of her left foot. Patient denies any new pain.  She has no new concerns or complaints    Allergies  Dexilant (Reaction: diarrhea)    HPI  This information w Musculoskeletal: Contractures, Assistive Devices  Integumentary (Hair/Skin/Nails): Calluses/Corns, Prone to Skin Tears  Psychiatric: Anxiety, Claustrophobia, Memory Loss, Depression, Mental Illness    Weekly Nutrition Assessment    Weekly Nutrition Assessm Wound #1 Left, Medial Foot is a Full Thickness Venous Ulcer and has received a status of Not Healed. Subsequent wound encounter measurements are 0.2cm length x 0.3cm width with no measurable depth, with an area of 0.06 sq cm . No tunneling has been noted. Coflex 2 layer with stockinette under        Plan    Wound Orders:  Wound #1 Left, Medial Foot     Wound Cleansing & Dressings  May shower with protection.  - with each dressing and compression wrap change cleanse the limb, foot, and between toes with soap patient doing well, continues to improve. discussed with patient and son who was with today that once healed will transition to her stockings.    Electronic Signature(s)   Signed By:  Date:    Krystle SIEGEL, ÁNGELA-AP, CFRAUL, [facility] 04/17/2017 08:47:4

## 2017-04-20 ENCOUNTER — OFFICE VISIT (OUTPATIENT)
Dept: WOUND CARE | Facility: HOSPITAL | Age: 82
End: 2017-04-20
Attending: NURSE PRACTITIONER
Payer: MEDICARE

## 2017-04-20 DIAGNOSIS — L97.322 NON-PRESSURE CHRONIC ULCER OF LEFT ANKLE WITH FAT LAYER EXPOSED (HCC): Primary | ICD-10-CM

## 2017-04-20 DIAGNOSIS — I73.9 PERIPHERAL VASCULAR DISEASE, UNSPECIFIED (HCC): ICD-10-CM

## 2017-04-20 PROCEDURE — 29581 APPL MULTLAYER CMPRN SYS LEG: CPT

## 2017-04-24 ENCOUNTER — OFFICE VISIT (OUTPATIENT)
Dept: WOUND CARE | Facility: HOSPITAL | Age: 82
End: 2017-04-24
Attending: NURSE PRACTITIONER
Payer: MEDICARE

## 2017-04-24 DIAGNOSIS — L97.322 NON-PRESSURE CHRONIC ULCER OF LEFT ANKLE WITH FAT LAYER EXPOSED (HCC): Primary | ICD-10-CM

## 2017-04-24 DIAGNOSIS — L97.929 CHRONIC VENOUS HYPERTENSION (IDIOPATHIC) WITH ULCER AND INFLAMMATION OF LEFT LOWER EXTREMITY (HCC): ICD-10-CM

## 2017-04-24 DIAGNOSIS — I87.332 CHRONIC VENOUS HYPERTENSION (IDIOPATHIC) WITH ULCER AND INFLAMMATION OF LEFT LOWER EXTREMITY (HCC): ICD-10-CM

## 2017-04-24 PROCEDURE — 29581 APPL MULTLAYER CMPRN SYS LEG: CPT

## 2017-04-24 NOTE — PROGRESS NOTES
Chief Complaint  This information was obtained from the patient  Patient is here for a follow up of her left foot. States that the wrap was too tight and she cut a small piece off the top.      Allergies  Dexilant (Reaction: diarrhea)    HPI  This informati Constitutional Symptoms (General Health):  Fatigue, Fever, Loss of Appetite, Marked Weight Change, Night Sweats, Chills  Respiratory: Cough, Shortness of Breath  Cardiovascular (Central/Peripheral): Chest Pain, Dyspnea on Exertion, Edema  Musculoskeletal: C see wound documentation. Skin and subcutaneous tissue without induration, bogginess, errythema or crepitus . Wound #1 Left, Medial Foot is a Full Thickness Venous Ulcer and has received a status of Not Healed.  Subsequent wound encounter measurements ar Wound #1 (Venous Ulcer) is located on the left, medial foot. A Multi-Layer Compression Wrap procedure was performed. A 2 Layers was applied with high 30-40 mmhg. The procedure was tolerated well.    General Notes:  coflex 2 layer        Plan    Wound Orders No signs and symptoms of osteomyelitis present. - Jan 2017 xray negative        dsicussed with patient that her wound has stalled. I will send note to dr Delphine Connolly and I asked patient to \"prepare\" for another injection by dr. Delphine Connolly.     Electronic Signatur

## 2017-04-26 ENCOUNTER — OFFICE VISIT (OUTPATIENT)
Dept: WOUND CARE | Facility: HOSPITAL | Age: 82
End: 2017-04-26
Attending: NURSE PRACTITIONER
Payer: MEDICARE

## 2017-04-26 DIAGNOSIS — I87.332 CHRONIC VENOUS HYPERTENSION (IDIOPATHIC) WITH ULCER AND INFLAMMATION OF LEFT LOWER EXTREMITY (HCC): Primary | ICD-10-CM

## 2017-04-26 DIAGNOSIS — L97.322 NON-PRESSURE CHRONIC ULCER OF LEFT ANKLE WITH FAT LAYER EXPOSED (HCC): ICD-10-CM

## 2017-04-26 DIAGNOSIS — L97.929 CHRONIC VENOUS HYPERTENSION (IDIOPATHIC) WITH ULCER AND INFLAMMATION OF LEFT LOWER EXTREMITY (HCC): Primary | ICD-10-CM

## 2017-04-26 PROCEDURE — 29581 APPL MULTLAYER CMPRN SYS LEG: CPT

## 2017-05-01 ENCOUNTER — OFFICE VISIT (OUTPATIENT)
Dept: WOUND CARE | Facility: HOSPITAL | Age: 82
End: 2017-05-01
Attending: NURSE PRACTITIONER
Payer: MEDICARE

## 2017-05-01 DIAGNOSIS — L97.929 CHRONIC VENOUS HYPERTENSION (IDIOPATHIC) WITH ULCER AND INFLAMMATION OF LEFT LOWER EXTREMITY (HCC): Primary | ICD-10-CM

## 2017-05-01 DIAGNOSIS — I87.332 CHRONIC VENOUS HYPERTENSION (IDIOPATHIC) WITH ULCER AND INFLAMMATION OF LEFT LOWER EXTREMITY (HCC): Primary | ICD-10-CM

## 2017-05-01 DIAGNOSIS — L97.322 NON-PRESSURE CHRONIC ULCER OF LEFT ANKLE WITH FAT LAYER EXPOSED (HCC): ICD-10-CM

## 2017-05-01 DIAGNOSIS — I73.9 PERIPHERAL VASCULAR DISEASE, UNSPECIFIED (HCC): ICD-10-CM

## 2017-05-01 PROCEDURE — 29581 APPL MULTLAYER CMPRN SYS LEG: CPT

## 2017-05-01 NOTE — PROGRESS NOTES
Chief Complaint  This information was obtained from the patient  Patient is here for a follow up visit for left foot wound. She states that she had increased pain over the weekend however today she states that she has no pain.     Allergies  Dexilant (React Integumentary (Hair/Skin/Nails): Ulcers, Hemosiderin Staining  Psychiatric: Nervousness / Tension    Patient denies complaints or symptoms related to:   Constitutional Symptoms (General Health):  Fatigue, Fever, Loss of Appetite, Marked Weight Change, Night Wound #1 Left, Medial Foot is a Full Thickness Venous Ulcer and has received a status of Not Healed. Subsequent wound encounter measurements are 0.3cm length x 0.2cm width with no measurable depth, with an area of 0.06 sq cm . No tunneling has been noted. Wound #1 (Venous Ulcer) is located on the left, medial foot. A Multi-Layer Compression Wrap procedure was performed by Harrell Schaumann, [address] [place] [address] Alva Botello. A Multi-Layer was applied with high 30-40 mmhg. The procedure was tolerated well.    General Notes:  coflex 2 layer Last albumin date and value: - June 2016 albumin 4.6/TP7.6  No signs and symptoms of osteomyelitis present. - Jan 2017 xray negative    Follow-Up Appointments:  A follow-up appointment should be scheduled.         will return to [facility], wound is essentia

## 2017-05-04 ENCOUNTER — OFFICE VISIT (OUTPATIENT)
Dept: WOUND CARE | Facility: HOSPITAL | Age: 82
End: 2017-05-04
Attending: NURSE PRACTITIONER
Payer: MEDICARE

## 2017-05-04 DIAGNOSIS — L97.929 CHRONIC VENOUS HYPERTENSION (IDIOPATHIC) WITH ULCER AND INFLAMMATION OF LEFT LOWER EXTREMITY (HCC): Primary | ICD-10-CM

## 2017-05-04 DIAGNOSIS — L97.322 NON-PRESSURE CHRONIC ULCER OF LEFT ANKLE WITH FAT LAYER EXPOSED (HCC): ICD-10-CM

## 2017-05-04 DIAGNOSIS — I87.332 CHRONIC VENOUS HYPERTENSION (IDIOPATHIC) WITH ULCER AND INFLAMMATION OF LEFT LOWER EXTREMITY (HCC): Primary | ICD-10-CM

## 2017-05-04 PROCEDURE — 29581 APPL MULTLAYER CMPRN SYS LEG: CPT

## 2017-05-08 ENCOUNTER — OFFICE VISIT (OUTPATIENT)
Dept: WOUND CARE | Facility: HOSPITAL | Age: 82
End: 2017-05-08
Attending: NURSE PRACTITIONER
Payer: MEDICARE

## 2017-05-08 DIAGNOSIS — I73.9 PERIPHERAL VASCULAR DISEASE, UNSPECIFIED (HCC): ICD-10-CM

## 2017-05-08 DIAGNOSIS — L97.929 CHRONIC VENOUS HYPERTENSION (IDIOPATHIC) WITH ULCER AND INFLAMMATION OF LEFT LOWER EXTREMITY (HCC): Primary | ICD-10-CM

## 2017-05-08 DIAGNOSIS — I87.332 CHRONIC VENOUS HYPERTENSION (IDIOPATHIC) WITH ULCER AND INFLAMMATION OF LEFT LOWER EXTREMITY (HCC): Primary | ICD-10-CM

## 2017-05-08 DIAGNOSIS — L97.322 NON-PRESSURE CHRONIC ULCER OF LEFT ANKLE WITH FAT LAYER EXPOSED (HCC): ICD-10-CM

## 2017-05-08 PROCEDURE — 29581 APPL MULTLAYER CMPRN SYS LEG: CPT

## 2017-05-08 NOTE — PROGRESS NOTES
Chief Complaint  This information was obtained from the patient  Patient is here for a follow up visit. No new concerns at this time.     Allergies  Dexilant (Reaction: diarrhea)    HPI  This information was obtained from the patient  INITIAL 1-26-17 monster Constitutional Symptoms (General Health):  Fatigue, Fever, Loss of Appetite, Marked Weight Change, Night Sweats, Chills  Respiratory: Cough, Shortness of Breath  Cardiovascular (Central/Peripheral): Chest Pain, Dyspnea on Exertion, Edema  Musculoskeletal: C Wound #1 Left, Medial Foot is a Full Thickness Venous Ulcer and has received a status of Not Healed. Subsequent wound encounter measurements are 0.2cm length x 0.2cm width with no measurable depth, with an area of 0.04 sq cm . No tunneling has been noted. Wound #1 (Venous Ulcer) is located on the left, medial foot. A Multi-Layer Compression Wrap procedure was performed by Teetee Frankel RN. A Multi-Layer was applied with high 30-40 mmhg. The procedure was tolerated well.    General Notes:  Coflex 2 layer wit Perfusion assessed by palpation of pulses. Last albumin date and value: - June 2016 albumin 4.6/TP7.6  No signs and symptoms of osteomyelitis present. - Jan 2017 xray negative    Follow-Up Appointments:  A follow-up appointment should be scheduled.

## 2017-05-11 ENCOUNTER — OFFICE VISIT (OUTPATIENT)
Dept: WOUND CARE | Facility: HOSPITAL | Age: 82
End: 2017-05-11
Attending: NURSE PRACTITIONER
Payer: MEDICARE

## 2017-05-11 DIAGNOSIS — L97.929 CHRONIC VENOUS HYPERTENSION (IDIOPATHIC) WITH ULCER AND INFLAMMATION OF LEFT LOWER EXTREMITY (HCC): Primary | ICD-10-CM

## 2017-05-11 DIAGNOSIS — I73.9 PERIPHERAL VASCULAR DISEASE, UNSPECIFIED (HCC): ICD-10-CM

## 2017-05-11 DIAGNOSIS — I87.332 CHRONIC VENOUS HYPERTENSION (IDIOPATHIC) WITH ULCER AND INFLAMMATION OF LEFT LOWER EXTREMITY (HCC): Primary | ICD-10-CM

## 2017-05-11 DIAGNOSIS — L97.322 NON-PRESSURE CHRONIC ULCER OF LEFT ANKLE WITH FAT LAYER EXPOSED (HCC): ICD-10-CM

## 2017-05-11 PROCEDURE — 29581 APPL MULTLAYER CMPRN SYS LEG: CPT

## 2017-05-15 ENCOUNTER — OFFICE VISIT (OUTPATIENT)
Dept: WOUND CARE | Facility: HOSPITAL | Age: 82
End: 2017-05-15
Attending: NURSE PRACTITIONER
Payer: MEDICARE

## 2017-05-15 DIAGNOSIS — L97.929 CHRONIC VENOUS HYPERTENSION (IDIOPATHIC) WITH ULCER AND INFLAMMATION OF LEFT LOWER EXTREMITY (HCC): Primary | ICD-10-CM

## 2017-05-15 DIAGNOSIS — I87.332 CHRONIC VENOUS HYPERTENSION (IDIOPATHIC) WITH ULCER AND INFLAMMATION OF LEFT LOWER EXTREMITY (HCC): Primary | ICD-10-CM

## 2017-05-15 DIAGNOSIS — I73.9 PERIPHERAL VASCULAR DISEASE, UNSPECIFIED (HCC): ICD-10-CM

## 2017-05-15 DIAGNOSIS — L97.322 NON-PRESSURE CHRONIC ULCER OF LEFT ANKLE WITH FAT LAYER EXPOSED (HCC): ICD-10-CM

## 2017-05-15 PROCEDURE — 29581 APPL MULTLAYER CMPRN SYS LEG: CPT

## 2017-05-18 ENCOUNTER — OFFICE VISIT (OUTPATIENT)
Dept: WOUND CARE | Facility: HOSPITAL | Age: 82
End: 2017-05-18
Attending: NURSE PRACTITIONER
Payer: MEDICARE

## 2017-05-18 DIAGNOSIS — L97.322 NON-PRESSURE CHRONIC ULCER OF LEFT ANKLE WITH FAT LAYER EXPOSED (HCC): ICD-10-CM

## 2017-05-18 DIAGNOSIS — I87.332 CHRONIC VENOUS HYPERTENSION (IDIOPATHIC) WITH ULCER AND INFLAMMATION OF LEFT LOWER EXTREMITY (HCC): Primary | ICD-10-CM

## 2017-05-18 DIAGNOSIS — L97.929 CHRONIC VENOUS HYPERTENSION (IDIOPATHIC) WITH ULCER AND INFLAMMATION OF LEFT LOWER EXTREMITY (HCC): Primary | ICD-10-CM

## 2017-05-18 PROCEDURE — 29581 APPL MULTLAYER CMPRN SYS LEG: CPT

## 2017-05-18 NOTE — PROGRESS NOTES
Chief Complaint  This information was obtained from the patient  Patient is here for a nurse visit on left leg wound.  The patient states that her compression wraps are too low and she is having pain above where her compression wrap ends    Allergies  Candi Respiratory: Cough, Shortness of Breath  Cardiovascular (Central/Peripheral): Chest Pain, Dyspnea on Exertion, Edema  Musculoskeletal: Contractures, Assistive Devices  Integumentary (Hair/Skin/Nails): Calluses/Corns, Prone to Skin Tears  Psychiatric: Anxie Wound #1 Left, Medial Foot is a Full Thickness Venous Ulcer and has received a status of Not Healed. Subsequent wound encounter measurements are 0.1cm length x 0.1cm width with no measurable depth, with an area of 0.01 sq cm . No tunneling has been noted. Plan    Wound Orders:  Wound #1 Left, Medial Foot     Wound Cleansing & Dressings  May shower with protection.  - with each dressing and compression wrap change cleanse the limb, foot, and between toes with soap and water and dry thoroughly  Hydrofera patient is doing well, continue current poc. if wound remains healed with no drainage will transition to her compression stockings next week.   Electronic Signature(s)   Signed By:  Date:    Darline SIEGEL, ÁNGELA-AP, [facility], [address] [place] [address],[address], [facility] 05/18/2017 07:47:23

## 2017-05-22 ENCOUNTER — OFFICE VISIT (OUTPATIENT)
Dept: WOUND CARE | Facility: HOSPITAL | Age: 82
End: 2017-05-22
Attending: NURSE PRACTITIONER
Payer: MEDICARE

## 2017-05-22 DIAGNOSIS — I73.9 PERIPHERAL VASCULAR DISEASE, UNSPECIFIED (HCC): ICD-10-CM

## 2017-05-22 DIAGNOSIS — L97.322 NON-PRESSURE CHRONIC ULCER OF LEFT ANKLE WITH FAT LAYER EXPOSED (HCC): ICD-10-CM

## 2017-05-22 DIAGNOSIS — L97.929 CHRONIC VENOUS HYPERTENSION (IDIOPATHIC) WITH ULCER AND INFLAMMATION OF LEFT LOWER EXTREMITY (HCC): Primary | ICD-10-CM

## 2017-05-22 DIAGNOSIS — I87.332 CHRONIC VENOUS HYPERTENSION (IDIOPATHIC) WITH ULCER AND INFLAMMATION OF LEFT LOWER EXTREMITY (HCC): Primary | ICD-10-CM

## 2017-05-22 PROCEDURE — 29581 APPL MULTLAYER CMPRN SYS LEG: CPT

## 2017-05-25 ENCOUNTER — OFFICE VISIT (OUTPATIENT)
Dept: WOUND CARE | Facility: HOSPITAL | Age: 82
End: 2017-05-25
Attending: NURSE PRACTITIONER
Payer: MEDICARE

## 2017-05-25 DIAGNOSIS — I87.332 CHRONIC VENOUS HYPERTENSION (IDIOPATHIC) WITH ULCER AND INFLAMMATION OF LEFT LOWER EXTREMITY (HCC): Primary | ICD-10-CM

## 2017-05-25 DIAGNOSIS — L97.929 CHRONIC VENOUS HYPERTENSION (IDIOPATHIC) WITH ULCER AND INFLAMMATION OF LEFT LOWER EXTREMITY (HCC): Primary | ICD-10-CM

## 2017-05-25 DIAGNOSIS — L97.322 NON-PRESSURE CHRONIC ULCER OF LEFT ANKLE WITH FAT LAYER EXPOSED (HCC): ICD-10-CM

## 2017-05-25 DIAGNOSIS — I73.9 PERIPHERAL VASCULAR DISEASE, UNSPECIFIED (HCC): ICD-10-CM

## 2017-05-25 PROCEDURE — 29581 APPL MULTLAYER CMPRN SYS LEG: CPT

## 2017-05-25 NOTE — PROGRESS NOTES
Chief Complaint  This information was obtained from the patient  Patient here for a followup for wound on the left lower leg. States there is nothing new with that wound but has concern with something on her Rt. lower leg.  Upon observation showing a yellow Integumentary (Hair/Skin/Nails): Ulcers, Hemosiderin Staining  Psychiatric: Nervousness / Tension    Patient denies complaints or symptoms related to:   Constitutional Symptoms (General Health):  Fatigue, Fever, Loss of Appetite, Marked Weight Change, Night dp/pt palpable bilaterally. Extremities + for varicosities, hemosideran staining L>R, atrophie shanti on medial ankles bilaterally, edema well controlled on the left, significant increase on the right. Capillary refill < 3 seconds. Digits are warm.  Thana Fling The periwound skin moisture is normal. The periwound skin exhibited: Edema, Atrophie Jocelyn, Hemosiderosis.  The periwound skin did not exhibit: Brawny Induration, Excoriation, Induration, Callus, Crepitus, Fluctuance, Friable, Rash, Cyanosis, Ecchymosis, Procedures    Wound #1  Wound #1 (Venous Ulcer) is located on the left, medial foot. A Multi-Layer Compression Wrap procedure was performed by Luis Lo MA. A 2 Layers was applied with high 30-40 mmhg. The procedure was tolerated well.    General Other: - SCHEDULE SEVERAL WEEKS OF 1 RN AND 1 NP APPOINTMENT    Misc/Additional Orders  Supplement with a daily multivitamin.   Increase dietary protein - focus on the following foods:  Protein: Meats, beans, eggs, milk and yogurt particularly Greek yogurt)

## 2017-05-31 ENCOUNTER — OFFICE VISIT (OUTPATIENT)
Dept: WOUND CARE | Facility: HOSPITAL | Age: 82
End: 2017-05-31
Attending: NURSE PRACTITIONER
Payer: MEDICARE

## 2017-05-31 DIAGNOSIS — I73.9 PERIPHERAL VASCULAR DISEASE, UNSPECIFIED (HCC): ICD-10-CM

## 2017-05-31 DIAGNOSIS — I87.332 CHRONIC VENOUS HYPERTENSION (IDIOPATHIC) WITH ULCER AND INFLAMMATION OF LEFT LOWER EXTREMITY (HCC): Primary | ICD-10-CM

## 2017-05-31 DIAGNOSIS — L97.322 NON-PRESSURE CHRONIC ULCER OF LEFT ANKLE WITH FAT LAYER EXPOSED (HCC): ICD-10-CM

## 2017-05-31 DIAGNOSIS — L97.929 CHRONIC VENOUS HYPERTENSION (IDIOPATHIC) WITH ULCER AND INFLAMMATION OF LEFT LOWER EXTREMITY (HCC): Primary | ICD-10-CM

## 2017-05-31 PROCEDURE — 29581 APPL MULTLAYER CMPRN SYS LEG: CPT

## 2017-06-05 ENCOUNTER — OFFICE VISIT (OUTPATIENT)
Dept: WOUND CARE | Facility: HOSPITAL | Age: 82
End: 2017-06-05
Attending: NURSE PRACTITIONER
Payer: MEDICARE

## 2017-06-05 DIAGNOSIS — I87.332 CHRONIC VENOUS HYPERTENSION (IDIOPATHIC) WITH ULCER AND INFLAMMATION OF LEFT LOWER EXTREMITY (HCC): Primary | ICD-10-CM

## 2017-06-05 DIAGNOSIS — L97.322 NON-PRESSURE CHRONIC ULCER OF LEFT ANKLE WITH FAT LAYER EXPOSED (HCC): ICD-10-CM

## 2017-06-05 DIAGNOSIS — L97.929 CHRONIC VENOUS HYPERTENSION (IDIOPATHIC) WITH ULCER AND INFLAMMATION OF LEFT LOWER EXTREMITY (HCC): Primary | ICD-10-CM

## 2017-06-05 DIAGNOSIS — I73.9 PERIPHERAL VASCULAR DISEASE, UNSPECIFIED (HCC): ICD-10-CM

## 2017-06-05 PROCEDURE — 29581 APPL MULTLAYER CMPRN SYS LEG: CPT

## 2017-06-08 ENCOUNTER — OFFICE VISIT (OUTPATIENT)
Dept: WOUND CARE | Facility: HOSPITAL | Age: 82
End: 2017-06-08
Attending: NURSE PRACTITIONER
Payer: MEDICARE

## 2017-06-08 DIAGNOSIS — I87.332 CHRONIC VENOUS HYPERTENSION (IDIOPATHIC) WITH ULCER AND INFLAMMATION OF LEFT LOWER EXTREMITY (HCC): Primary | ICD-10-CM

## 2017-06-08 DIAGNOSIS — L97.929 CHRONIC VENOUS HYPERTENSION (IDIOPATHIC) WITH ULCER AND INFLAMMATION OF LEFT LOWER EXTREMITY (HCC): Primary | ICD-10-CM

## 2017-06-08 DIAGNOSIS — L97.322 NON-PRESSURE CHRONIC ULCER OF LEFT ANKLE WITH FAT LAYER EXPOSED (HCC): ICD-10-CM

## 2017-06-08 PROCEDURE — 29581 APPL MULTLAYER CMPRN SYS LEG: CPT

## 2017-06-08 NOTE — PROGRESS NOTES
Chief Complaint  This information was obtained from the patient  Patient here for a followup of right foot wound. She is having swelling above where wrap ends.      Allergies  Dexilant (Reaction: diarrhea)    HPI  This information was obtained from the cielo Constitutional Symptoms (General Health):  Fatigue, Fever, Loss of Appetite, Marked Weight Change, Night Sweats, Chills  Respiratory: Cough, Shortness of Breath  Cardiovascular (Central/Peripheral): Chest Pain, Dyspnea on Exertion, Edema  Musculoskeletal: C Wound #2 Right, Medial Foot is a chronic Full Thickness Venous Ulcer and has received a status of Not Healed. Subsequent wound encounter measurements are 0.2cm length x 0.2cm width with no measurable depth, with an area of 0.04 sq cm .  No tunneling has bee I87.331: Chronic venous hypertension (idiopathic) with ulcer and inflammation of right lower extremity  L97.312: Non-pressure chronic ulcer of right ankle with fat layer exposed  I10: Essential (primary) hypertension  General Notes:  I spent 25 minutes wit Vitamin C: Citrus fruits and juices, strawberries, tomatoes, tomato juice, peppers, baked potatoes, spinach, broccoli, cauliflower, [place] sprouts, cabbage  Vitamin A: Dark green, leafy vegetables, orange or yellow vegetables, cantaloupe, fortified dairy

## 2017-06-12 ENCOUNTER — OFFICE VISIT (OUTPATIENT)
Dept: WOUND CARE | Facility: HOSPITAL | Age: 82
End: 2017-06-12
Attending: NURSE PRACTITIONER
Payer: MEDICARE

## 2017-06-12 DIAGNOSIS — L97.929 CHRONIC VENOUS HYPERTENSION (IDIOPATHIC) WITH ULCER AND INFLAMMATION OF LEFT LOWER EXTREMITY (HCC): Primary | ICD-10-CM

## 2017-06-12 DIAGNOSIS — I87.332 CHRONIC VENOUS HYPERTENSION (IDIOPATHIC) WITH ULCER AND INFLAMMATION OF LEFT LOWER EXTREMITY (HCC): Primary | ICD-10-CM

## 2017-06-12 DIAGNOSIS — L97.322 NON-PRESSURE CHRONIC ULCER OF LEFT ANKLE WITH FAT LAYER EXPOSED (HCC): ICD-10-CM

## 2017-06-12 PROCEDURE — 29581 APPL MULTLAYER CMPRN SYS LEG: CPT

## 2017-06-15 ENCOUNTER — OFFICE VISIT (OUTPATIENT)
Dept: WOUND CARE | Facility: HOSPITAL | Age: 82
End: 2017-06-15
Attending: NURSE PRACTITIONER
Payer: MEDICARE

## 2017-06-15 DIAGNOSIS — L97.322 NON-PRESSURE CHRONIC ULCER OF LEFT ANKLE WITH FAT LAYER EXPOSED (HCC): ICD-10-CM

## 2017-06-15 DIAGNOSIS — I87.332 CHRONIC VENOUS HYPERTENSION (IDIOPATHIC) WITH ULCER AND INFLAMMATION OF LEFT LOWER EXTREMITY (HCC): Primary | ICD-10-CM

## 2017-06-15 DIAGNOSIS — I73.9 PERIPHERAL VASCULAR DISEASE, UNSPECIFIED (HCC): ICD-10-CM

## 2017-06-15 DIAGNOSIS — L97.929 CHRONIC VENOUS HYPERTENSION (IDIOPATHIC) WITH ULCER AND INFLAMMATION OF LEFT LOWER EXTREMITY (HCC): Primary | ICD-10-CM

## 2017-06-15 PROCEDURE — 29581 APPL MULTLAYER CMPRN SYS LEG: CPT

## 2017-06-15 NOTE — PROGRESS NOTES
Chief Complaint  This information was obtained from the patient  Patient here for a follow visit. Patient states she would like us to look at both feet today. She states she is \"scared something is going on. \" Patient denies any pain.      Allergies  Dexil Patient complains of:   Cardiovascular (Central/Peripheral):  Other (hx of varicose veins, venous insufficiency)  Musculoskeletal: Joint Pain (osteoarthritis), Backache (kyphosis with neck pain)  Integumentary (Hair/Skin/Nails): Ulcers, Hemosiderin Staining dp/pt palpable bilaterally. Extremities + for varicosities, corona phlebectatica, hemosideran staining L>R, atrophie shanti on medial ankles bilaterally, edema well controlled bilaterally. Capillary refill < 3 seconds. Digits are warm.  toenails are wnl fo (Encounter Diagnosis) I87.331 - Chronic venous hypertension (idiopathic) with ulcer and inflammation of right lower extremity  (Encounter Diagnosis) L97.312 - Non-pressure chronic ulcer of right ankle with fat layer exposed  (Encounter Diagnosis) I10 - Ess Protein: Meats, beans, eggs, milk and yogurt particularly [place] yogurt), tofu, soy nuts, soy protein products  Vitamin C: Citrus fruits and juices, strawberries, tomatoes, tomato juice, peppers, baked potatoes, spinach, broccoli, cauliflower, [place] spro

## 2017-06-19 ENCOUNTER — OFFICE VISIT (OUTPATIENT)
Dept: WOUND CARE | Facility: HOSPITAL | Age: 82
End: 2017-06-19
Attending: NURSE PRACTITIONER
Payer: MEDICARE

## 2017-06-19 DIAGNOSIS — L97.929 CHRONIC VENOUS HYPERTENSION (IDIOPATHIC) WITH ULCER AND INFLAMMATION OF LEFT LOWER EXTREMITY (HCC): Primary | ICD-10-CM

## 2017-06-19 DIAGNOSIS — I87.332 CHRONIC VENOUS HYPERTENSION (IDIOPATHIC) WITH ULCER AND INFLAMMATION OF LEFT LOWER EXTREMITY (HCC): Primary | ICD-10-CM

## 2017-06-19 DIAGNOSIS — I73.9 PERIPHERAL VASCULAR DISEASE, UNSPECIFIED (HCC): ICD-10-CM

## 2017-06-19 DIAGNOSIS — L97.322 NON-PRESSURE CHRONIC ULCER OF LEFT ANKLE WITH FAT LAYER EXPOSED (HCC): ICD-10-CM

## 2017-06-19 PROCEDURE — 29581 APPL MULTLAYER CMPRN SYS LEG: CPT

## 2017-06-22 ENCOUNTER — OFFICE VISIT (OUTPATIENT)
Dept: WOUND CARE | Facility: HOSPITAL | Age: 82
End: 2017-06-22
Attending: NURSE PRACTITIONER
Payer: MEDICARE

## 2017-06-22 DIAGNOSIS — I87.332 CHRONIC VENOUS HYPERTENSION (IDIOPATHIC) WITH ULCER AND INFLAMMATION OF LEFT LOWER EXTREMITY (HCC): Primary | ICD-10-CM

## 2017-06-22 DIAGNOSIS — L97.929 CHRONIC VENOUS HYPERTENSION (IDIOPATHIC) WITH ULCER AND INFLAMMATION OF LEFT LOWER EXTREMITY (HCC): Primary | ICD-10-CM

## 2017-06-22 DIAGNOSIS — L97.322 NON-PRESSURE CHRONIC ULCER OF LEFT ANKLE WITH FAT LAYER EXPOSED (HCC): ICD-10-CM

## 2017-06-22 DIAGNOSIS — I73.9 PERIPHERAL VASCULAR DISEASE, UNSPECIFIED (HCC): ICD-10-CM

## 2017-06-22 PROCEDURE — 29581 APPL MULTLAYER CMPRN SYS LEG: CPT

## 2017-06-22 NOTE — PROGRESS NOTES
Chief Complaint  This information was obtained from the patient  Patient here for a follow up. Patient states the \"extra pad is causing pain. \" No other complaints or concerns.     Allergies  Dexilant (Reaction: diarrhea)    HPI  This information was [place] Integumentary (Hair/Skin/Nails): Ulcers, Hemosiderin Staining  Psychiatric: Nervousness / Tension    Patient denies complaints or symptoms related to:   Constitutional Symptoms (General Health):  Fatigue, Fever, Loss of Appetite, Marked Weight Change, Night Integumentary (Hair, Skin)  see wound documentation. see wound documentation. Wound #2 Right, Medial Foot is a chronic Full Thickness Venous Ulcer and has received a status of Not Healed.  Subsequent wound encounter measurements are 0.6cm length x 0.7cm I spent 25 minutes with the patient. See plan. More than 50% of that time was face to face and spent counseling the patient and/or on coordination of care.  The diagnosis, prognosis, and general treatment was explained to the patient and the family        P Vitamin A: Dark green, leafy vegetables, orange or yellow vegetables, cantaloupe, fortified dairy products, liver, fortified cereals  Zinc: Fortified cereals, red meats, seafood    S/S of Infection  Non-adherence    Care summary  Wound type: - venous  Woun

## 2017-06-26 ENCOUNTER — OFFICE VISIT (OUTPATIENT)
Dept: WOUND CARE | Facility: HOSPITAL | Age: 82
End: 2017-06-26
Attending: NURSE PRACTITIONER
Payer: MEDICARE

## 2017-06-26 DIAGNOSIS — L97.929 CHRONIC VENOUS HYPERTENSION (IDIOPATHIC) WITH ULCER AND INFLAMMATION OF LEFT LOWER EXTREMITY (HCC): Primary | ICD-10-CM

## 2017-06-26 DIAGNOSIS — I87.332 CHRONIC VENOUS HYPERTENSION (IDIOPATHIC) WITH ULCER AND INFLAMMATION OF LEFT LOWER EXTREMITY (HCC): Primary | ICD-10-CM

## 2017-06-26 DIAGNOSIS — I73.9 PERIPHERAL VASCULAR DISEASE, UNSPECIFIED (HCC): ICD-10-CM

## 2017-06-26 DIAGNOSIS — L97.322 NON-PRESSURE CHRONIC ULCER OF LEFT ANKLE WITH FAT LAYER EXPOSED (HCC): ICD-10-CM

## 2017-06-26 PROCEDURE — 29581 APPL MULTLAYER CMPRN SYS LEG: CPT

## 2017-06-29 ENCOUNTER — OFFICE VISIT (OUTPATIENT)
Dept: WOUND CARE | Facility: HOSPITAL | Age: 82
End: 2017-06-29
Attending: NURSE PRACTITIONER
Payer: MEDICARE

## 2017-06-29 DIAGNOSIS — L97.929 CHRONIC VENOUS HYPERTENSION (IDIOPATHIC) WITH ULCER AND INFLAMMATION OF LEFT LOWER EXTREMITY (HCC): Primary | ICD-10-CM

## 2017-06-29 DIAGNOSIS — I87.332 CHRONIC VENOUS HYPERTENSION (IDIOPATHIC) WITH ULCER AND INFLAMMATION OF LEFT LOWER EXTREMITY (HCC): Primary | ICD-10-CM

## 2017-06-29 DIAGNOSIS — L97.322 NON-PRESSURE CHRONIC ULCER OF LEFT ANKLE WITH FAT LAYER EXPOSED (HCC): ICD-10-CM

## 2017-06-29 DIAGNOSIS — I73.9 PERIPHERAL VASCULAR DISEASE, UNSPECIFIED (HCC): ICD-10-CM

## 2017-06-29 PROCEDURE — 29581 APPL MULTLAYER CMPRN SYS LEG: CPT

## 2017-06-29 NOTE — PROGRESS NOTES
Chief Complaint  This information was obtained from the patient  Patient is here for follow up wound care to rt foot wound.  She arrives with wrap intact and voices no new concerns    Allergies  Dexilant (Reaction: diarrhea)    HPI  This information was obt Patient denies complaints or symptoms related to:   Constitutional Symptoms (General Health):  Fatigue, Fever, Loss of Appetite, Marked Weight Change, Night Sweats, Chills  Respiratory: Cough, Shortness of Breath  Cardiovascular (Central/Peripheral): Chest dp/pt palpable right. Extremities + for varicosities, corona phlebectatica, hemosideran staining L>R, atrophie shanti on medial ankles bilaterally, edema well controlled bilaterally. Capillary refill < 3 seconds. Digits are warm.  toenails are wnl for colo (Encounter Diagnosis) I10 - Essential (primary) hypertension    Diagnoses    ICD-10  I87.331: Chronic venous hypertension (idiopathic) with ulcer and inflammation of right lower extremity  L97.312: Non-pressure chronic ulcer of right ankle with fat layer e Protein: Meats, beans, eggs, milk and yogurt particularly [place] yogurt), tofu, soy nuts, soy protein products  Vitamin C: Citrus fruits and juices, strawberries, tomatoes, tomato juice, peppers, baked potatoes, spinach, broccoli, cauliflower, [place] spro I had a very long discussion with the patient regarding my conversation with dr. Mayford Phalen and the risk that doing a px to address the veins in the area is very high for limb loss. she states that she is frustrated because she can't iron or drive.  I discussed

## 2017-07-03 ENCOUNTER — OFFICE VISIT (OUTPATIENT)
Dept: WOUND CARE | Facility: HOSPITAL | Age: 82
End: 2017-07-03
Attending: NURSE PRACTITIONER
Payer: MEDICARE

## 2017-07-03 DIAGNOSIS — L97.929 CHRONIC VENOUS HYPERTENSION (IDIOPATHIC) WITH ULCER AND INFLAMMATION OF LEFT LOWER EXTREMITY (HCC): Primary | ICD-10-CM

## 2017-07-03 DIAGNOSIS — I87.332 CHRONIC VENOUS HYPERTENSION (IDIOPATHIC) WITH ULCER AND INFLAMMATION OF LEFT LOWER EXTREMITY (HCC): Primary | ICD-10-CM

## 2017-07-03 DIAGNOSIS — L97.322 NON-PRESSURE CHRONIC ULCER OF LEFT ANKLE WITH FAT LAYER EXPOSED (HCC): ICD-10-CM

## 2017-07-03 DIAGNOSIS — I73.9 PERIPHERAL VASCULAR DISEASE, UNSPECIFIED (HCC): ICD-10-CM

## 2017-07-03 PROCEDURE — 29581 APPL MULTLAYER CMPRN SYS LEG: CPT

## 2017-07-06 ENCOUNTER — OFFICE VISIT (OUTPATIENT)
Dept: WOUND CARE | Facility: HOSPITAL | Age: 82
End: 2017-07-06
Attending: NURSE PRACTITIONER
Payer: MEDICARE

## 2017-07-06 DIAGNOSIS — I87.332 CHRONIC VENOUS HYPERTENSION (IDIOPATHIC) WITH ULCER AND INFLAMMATION OF LEFT LOWER EXTREMITY (HCC): Primary | ICD-10-CM

## 2017-07-06 DIAGNOSIS — L97.322 NON-PRESSURE CHRONIC ULCER OF LEFT ANKLE WITH FAT LAYER EXPOSED (HCC): ICD-10-CM

## 2017-07-06 DIAGNOSIS — L97.929 CHRONIC VENOUS HYPERTENSION (IDIOPATHIC) WITH ULCER AND INFLAMMATION OF LEFT LOWER EXTREMITY (HCC): Primary | ICD-10-CM

## 2017-07-06 DIAGNOSIS — I73.9 PERIPHERAL VASCULAR DISEASE, UNSPECIFIED (HCC): ICD-10-CM

## 2017-07-06 PROCEDURE — 29581 APPL MULTLAYER CMPRN SYS LEG: CPT

## 2017-07-06 NOTE — PROGRESS NOTES
Chief Complaint  This information was obtained from the patient  Patient is here for follow up wound care to rt foot wound. States it was hurting, took an Advil this morning. Has had more pain throughout the week.      Allergies  Dexilant (Reaction: diarrhe Integumentary (Hair/Skin/Nails): Ulcers, Hemosiderin Staining  Psychiatric: Other (frustration), Nervousness / Tension    Patient denies complaints or symptoms related to:   Constitutional Symptoms (General Health):  Fatigue, Fever, Loss of Appetite, Marked dp/pt palpable right. Extremities + for varicosities, corona phlebectatica, hemosideran staining L>R, atrophie shanti on medial ankles bilaterally, edema well controlled bilaterally. Capillary refill < 3 seconds. Digits are warm.  toenails are wnl for colo (Encounter Diagnosis) I10 - Essential (primary) hypertension    Diagnoses    ICD-10  I87.331: Chronic venous hypertension (idiopathic) with ulcer and inflammation of right lower extremity  L97.312: Non-pressure chronic ulcer of right ankle with fat layer e RN visit for assessment of wound(s). - on Monday for wound assessment and edema management    Misc/Additional Orders  Supplement with a daily multivitamin.   Increase dietary protein - focus on the following foods:  Protein: Meats, beans, eggs, milk and yog

## 2017-07-10 ENCOUNTER — OFFICE VISIT (OUTPATIENT)
Dept: WOUND CARE | Facility: HOSPITAL | Age: 82
End: 2017-07-10
Attending: NURSE PRACTITIONER
Payer: MEDICARE

## 2017-07-10 DIAGNOSIS — L97.929 CHRONIC VENOUS HYPERTENSION (IDIOPATHIC) WITH ULCER AND INFLAMMATION OF LEFT LOWER EXTREMITY (HCC): Primary | ICD-10-CM

## 2017-07-10 DIAGNOSIS — I87.332 CHRONIC VENOUS HYPERTENSION (IDIOPATHIC) WITH ULCER AND INFLAMMATION OF LEFT LOWER EXTREMITY (HCC): Primary | ICD-10-CM

## 2017-07-10 DIAGNOSIS — I73.9 PERIPHERAL VASCULAR DISEASE, UNSPECIFIED (HCC): ICD-10-CM

## 2017-07-10 DIAGNOSIS — L97.322 NON-PRESSURE CHRONIC ULCER OF LEFT ANKLE WITH FAT LAYER EXPOSED (HCC): ICD-10-CM

## 2017-07-10 PROCEDURE — 99213 OFFICE O/P EST LOW 20 MIN: CPT

## 2017-07-13 ENCOUNTER — OFFICE VISIT (OUTPATIENT)
Dept: WOUND CARE | Facility: HOSPITAL | Age: 82
End: 2017-07-13
Attending: NURSE PRACTITIONER
Payer: MEDICARE

## 2017-07-13 DIAGNOSIS — L97.929 CHRONIC VENOUS HYPERTENSION (IDIOPATHIC) WITH ULCER AND INFLAMMATION OF LEFT LOWER EXTREMITY (HCC): Primary | ICD-10-CM

## 2017-07-13 DIAGNOSIS — I87.332 CHRONIC VENOUS HYPERTENSION (IDIOPATHIC) WITH ULCER AND INFLAMMATION OF LEFT LOWER EXTREMITY (HCC): Primary | ICD-10-CM

## 2017-07-13 DIAGNOSIS — I73.9 PERIPHERAL VASCULAR DISEASE, UNSPECIFIED (HCC): ICD-10-CM

## 2017-07-13 DIAGNOSIS — L97.322 NON-PRESSURE CHRONIC ULCER OF LEFT ANKLE WITH FAT LAYER EXPOSED (HCC): ICD-10-CM

## 2017-07-13 PROCEDURE — 99213 OFFICE O/P EST LOW 20 MIN: CPT

## 2017-07-13 NOTE — PROGRESS NOTES
Chief Complaint  This information was obtained from the patient  Patient is here for a wound care follow up. She continues to have intermittent pain that gets as high as 8/10.      Allergies  Dexilant (Reaction: diarrhea)    HPI  This information was obtain Integumentary (Hair/Skin/Nails): Ulcers, Hemosiderin Staining  Psychiatric: Other (frustration), Nervousness / Tension    Patient denies complaints or symptoms related to:   Constitutional Symptoms (General Health):  Fatigue, Fever, Loss of Appetite, Marked see wound documentation. see wound documentation. Wound #2 Right, Medial Foot is a chronic Full Thickness Venous Ulcer and has received a status of Not Healed.  Subsequent wound encounter measurements are 1.5cm length x 2cm width x 0.2cm depth, with an I spent 40 minutes with the patient. See plan. More than 50% of that time was face to face and spent counseling the patient and/or on coordination of care. The diagnosis, prognosis, and general treatment was explained to the patient and the family. daughter is concerned that wound will continue to worsen. I demonstrated for her the atrophie shanti areas that may break down. they are asking when dr. Benson Eugene would be willing to do the px.  I explained that it is a risk/benefit and if her limb is going

## 2017-07-20 ENCOUNTER — OFFICE VISIT (OUTPATIENT)
Dept: WOUND CARE | Facility: HOSPITAL | Age: 82
End: 2017-07-20
Attending: NURSE PRACTITIONER
Payer: MEDICARE

## 2017-07-20 DIAGNOSIS — I87.332 CHRONIC VENOUS HYPERTENSION (IDIOPATHIC) WITH ULCER AND INFLAMMATION OF LEFT LOWER EXTREMITY (HCC): Primary | ICD-10-CM

## 2017-07-20 DIAGNOSIS — I73.9 PERIPHERAL VASCULAR DISEASE, UNSPECIFIED (HCC): ICD-10-CM

## 2017-07-20 DIAGNOSIS — L97.322 NON-PRESSURE CHRONIC ULCER OF LEFT ANKLE WITH FAT LAYER EXPOSED (HCC): ICD-10-CM

## 2017-07-20 DIAGNOSIS — L97.929 CHRONIC VENOUS HYPERTENSION (IDIOPATHIC) WITH ULCER AND INFLAMMATION OF LEFT LOWER EXTREMITY (HCC): Primary | ICD-10-CM

## 2017-07-20 PROCEDURE — 99213 OFFICE O/P EST LOW 20 MIN: CPT

## 2017-07-20 NOTE — PROGRESS NOTES
Chief Complaint  This information was obtained from the patient  Patient is here for a wound care follow up. She arrives with compression stockings in place stating she is applying them herself. She states her wound looks about the same.  She denies increas Musculoskeletal: Joint Pain (osteoarthritis), Backache (kyphosis with neck pain)  Integumentary (Hair/Skin/Nails): Ulcers, Hemosiderin Staining  Psychiatric: Nervousness / Tension    Patient denies complaints or symptoms related to:   Constitutional Sympto dp/pt palpable right. right lower Extremity + for varicosities, corona phlebectatica, hemosideran staining, atrophie shanti on medial ankle, edema well controlled, stocking on the left. Capillary refill < 3 seconds. Digits are warm.  toenails are wnl for c (Encounter Diagnosis) L97.312 - Non-pressure chronic ulcer of right ankle with fat layer exposed  (Encounter Diagnosis) I10 - Essential (primary) hypertension    Diagnoses    ICD-10  I87.331: Chronic venous hypertension (idiopathic) with ulcer and inflamma Perfusion assessed by doppler. - strong pulsatile signals at the dp/pt/distal hallux bilaterally  Perfusion assessed by palpation of pulses.    Venous/Vascular consult: - left-intervention by dr. Moshe Yap opinion by Ian Factor 27  right-unable to do veno

## 2017-07-24 ENCOUNTER — APPOINTMENT (OUTPATIENT)
Dept: WOUND CARE | Facility: HOSPITAL | Age: 82
End: 2017-07-24
Attending: NURSE PRACTITIONER
Payer: MEDICARE

## 2017-07-27 ENCOUNTER — APPOINTMENT (OUTPATIENT)
Dept: WOUND CARE | Facility: HOSPITAL | Age: 82
End: 2017-07-27
Attending: NURSE PRACTITIONER
Payer: MEDICARE

## 2017-07-28 ENCOUNTER — OFFICE VISIT (OUTPATIENT)
Dept: WOUND CARE | Facility: HOSPITAL | Age: 82
End: 2017-07-28
Attending: NURSE PRACTITIONER
Payer: MEDICARE

## 2017-07-28 DIAGNOSIS — L97.929 CHRONIC VENOUS HYPERTENSION (IDIOPATHIC) WITH ULCER AND INFLAMMATION OF LEFT LOWER EXTREMITY (HCC): Primary | ICD-10-CM

## 2017-07-28 DIAGNOSIS — I87.332 CHRONIC VENOUS HYPERTENSION (IDIOPATHIC) WITH ULCER AND INFLAMMATION OF LEFT LOWER EXTREMITY (HCC): Primary | ICD-10-CM

## 2017-07-28 DIAGNOSIS — L97.322 NON-PRESSURE CHRONIC ULCER OF LEFT ANKLE WITH FAT LAYER EXPOSED (HCC): ICD-10-CM

## 2017-07-28 DIAGNOSIS — I73.9 PERIPHERAL VASCULAR DISEASE, UNSPECIFIED (HCC): ICD-10-CM

## 2017-07-28 PROCEDURE — 99213 OFFICE O/P EST LOW 20 MIN: CPT

## 2018-01-11 ENCOUNTER — OFFICE VISIT (OUTPATIENT)
Dept: WOUND CARE | Facility: HOSPITAL | Age: 83
End: 2018-01-11
Attending: NURSE PRACTITIONER
Payer: MEDICARE

## 2018-01-11 DIAGNOSIS — I87.332 CHRONIC VENOUS HYPERTENSION (IDIOPATHIC) WITH ULCER AND INFLAMMATION OF LEFT LOWER EXTREMITY (HCC): ICD-10-CM

## 2018-01-11 DIAGNOSIS — L97.929 CHRONIC VENOUS HYPERTENSION (IDIOPATHIC) WITH ULCER AND INFLAMMATION OF LEFT LOWER EXTREMITY (HCC): ICD-10-CM

## 2018-01-11 DIAGNOSIS — L97.322 NON-PRESSURE CHRONIC ULCER OF LEFT ANKLE WITH FAT LAYER EXPOSED (HCC): Primary | ICD-10-CM

## 2018-01-11 PROCEDURE — 99215 OFFICE O/P EST HI 40 MIN: CPT

## 2018-01-11 NOTE — PROGRESS NOTES
Chief Complaint  This information was obtained from the patient  Patient is here for a wound on right medial ankle. Pt came in with a unna wrap on. [facility] wrapped a week ago for a check-up and was told to keep it on for a week.  She has been dressi 1/11/18- Will begin xerform with betamethasone to the area covered with gauze. ABD pad used to pad ant tib. Coflex 2 layer applied for compression. .. GF    Family History  This information was obtained from the patient  Cancer - Mother, Stroke - Father    S bp wnl for patient. Pulse Regular and wnl for patient. Aubree Riedel Respirations easy and unlabored. Temperature wnl. Weight normal for height. . Appearance neat and clean. Appears in no acute distress. Well nourished and well developed.  Height/Length: 60 in (152.4 cm) The periwound skin texture is normal. The periwound skin exhibited: Dry/Scaly, Atrophie Jocelyn, Erythema, Hemosiderosis. The temperature of the periwound skin is Warm. Periwound skin does not exhibit signs or symptoms of infection.  Local Pulse is Normal. Vitamin C: Citrus fruits and juices, strawberries, tomatoes, tomato juice, peppers, baked potatoes, spinach, broccoli, cauliflower, [place] sprouts, cabbage  Vitamin A: Dark green, leafy vegetables, orange or yellow vegetables, cantaloupe, fortified dairy

## 2018-01-12 PROBLEM — M80.80XS: Status: ACTIVE | Noted: 2018-01-12

## 2018-01-12 PROBLEM — L97.311 ANKLE ULCER, RIGHT, LIMITED TO BREAKDOWN OF SKIN (HCC): Status: ACTIVE | Noted: 2018-01-12

## 2018-01-18 ENCOUNTER — OFFICE VISIT (OUTPATIENT)
Dept: WOUND CARE | Facility: HOSPITAL | Age: 83
End: 2018-01-18
Attending: NURSE PRACTITIONER
Payer: MEDICARE

## 2018-01-18 DIAGNOSIS — L97.322 NON-PRESSURE CHRONIC ULCER OF LEFT ANKLE WITH FAT LAYER EXPOSED (HCC): Primary | ICD-10-CM

## 2018-01-18 DIAGNOSIS — I87.332 CHRONIC VENOUS HYPERTENSION (IDIOPATHIC) WITH ULCER AND INFLAMMATION OF LEFT LOWER EXTREMITY (HCC): ICD-10-CM

## 2018-01-18 DIAGNOSIS — L97.929 CHRONIC VENOUS HYPERTENSION (IDIOPATHIC) WITH ULCER AND INFLAMMATION OF LEFT LOWER EXTREMITY (HCC): ICD-10-CM

## 2018-01-18 DIAGNOSIS — I73.9 PERIPHERAL VASCULAR DISEASE, UNSPECIFIED (HCC): ICD-10-CM

## 2018-01-18 PROCEDURE — 29581 APPL MULTLAYER CMPRN SYS LEG: CPT

## 2018-01-18 NOTE — PROGRESS NOTES
Chief Complaint  This information was obtained from the patient  Patient presents for follow up.      Allergies  Dexilant (Reaction: diarrhea)    HPI  This information was obtained from the patient  INITIAL 1-26-17 patient is a 81 yo female who presents wit 1-18-18 patient returns today to clinic with daughter, they state they saw the pcp and pcp is concerned of patient taking ibuprofen for pain and wants her to stick to Tylenol which per patient and daughter does not help with her pain.  she states the pain i bp wnl for patient. Pulse Regular and wnl for patient. Slater August Respirations easy and unlabored. Temperature wnl. Frail appearing but weight normal for height. Appearance neat and clean. Appears in no acute distress. Well nourished and well developed.  Height/Lengt judgement and insight is intact, however assistance by family for medical decision-making. Alert and oriented times 3. No evidence of depression, anxiety, or agitation. Calm, cooperative, and communicative. Appropriate interactions and affect.         Asses Vitamin A: Dark green, leafy vegetables, orange or yellow vegetables, cantaloupe, fortified dairy products, liver, fortified cereals  Zinc: Fortified cereals, red meats, seafood    S/S of Infection    Care summary  Wound type: - venous  Wound no change.  No

## 2018-01-25 ENCOUNTER — OFFICE VISIT (OUTPATIENT)
Dept: WOUND CARE | Facility: HOSPITAL | Age: 83
End: 2018-01-25
Attending: NURSE PRACTITIONER
Payer: MEDICARE

## 2018-01-25 DIAGNOSIS — I87.332 CHRONIC VENOUS HYPERTENSION (IDIOPATHIC) WITH ULCER AND INFLAMMATION OF LEFT LOWER EXTREMITY (HCC): ICD-10-CM

## 2018-01-25 DIAGNOSIS — I73.9 PERIPHERAL VASCULAR DISEASE, UNSPECIFIED (HCC): ICD-10-CM

## 2018-01-25 DIAGNOSIS — L97.322 NON-PRESSURE CHRONIC ULCER OF LEFT ANKLE WITH FAT LAYER EXPOSED (HCC): Primary | ICD-10-CM

## 2018-01-25 DIAGNOSIS — T14.8XXA NONHEALING NONSURGICAL WOUND: ICD-10-CM

## 2018-01-25 DIAGNOSIS — L97.929 CHRONIC VENOUS HYPERTENSION (IDIOPATHIC) WITH ULCER AND INFLAMMATION OF LEFT LOWER EXTREMITY (HCC): ICD-10-CM

## 2018-01-25 PROCEDURE — 29581 APPL MULTLAYER CMPRN SYS LEG: CPT

## 2018-01-25 NOTE — PROGRESS NOTES
Chief Complaint  This information was obtained from the patient  Patient presents for follow up of R ankle wound. Pt states she is having constant sharp pain, 5/10.     Allergies  Dexilant (Reaction: diarrhea)    HPI  This information was obtained from the 1-18-18 patient returns today to clinic with daughter, they state they saw the pcp and pcp is concerned of patient taking ibuprofen for pain and wants her to stick to Tylenol which per patient and daughter does not help with her pain.  she states the pain i Additional Information  Medication reconciliation completed at today's visit. : Yes        Objective    Constitutional  bp wnl for patient. Pulse Regular and wnl for patient. Arch Radha Respirations easy and unlabored. Temperature wnl. frail and thin appearing.  Appe judgement and insight is intact, however assistance by family for medical decision-making. Alert and oriented times 3. No evidence of depression, anxiety, or agitation. Calm, cooperative, and communicative. Appropriate interactions and affect.         Asses Reviewed and evaluated labs. - January 2018 bun 42, creat 1.09, gfr43  Wound type: - venous  Wound improving. No s/s of infection. Perfusion assessed by ESTRELLITA/TBI - bedside-l=. 92 and right=1  Perfusion assessed by doppler. - strong pulsatile signals at the

## 2018-02-01 ENCOUNTER — OFFICE VISIT (OUTPATIENT)
Dept: WOUND CARE | Facility: HOSPITAL | Age: 83
End: 2018-02-01
Attending: NURSE PRACTITIONER
Payer: MEDICARE

## 2018-02-01 DIAGNOSIS — L97.929 CHRONIC VENOUS HYPERTENSION (IDIOPATHIC) WITH ULCER AND INFLAMMATION OF LEFT LOWER EXTREMITY (HCC): ICD-10-CM

## 2018-02-01 DIAGNOSIS — I87.332 CHRONIC VENOUS HYPERTENSION (IDIOPATHIC) WITH ULCER AND INFLAMMATION OF LEFT LOWER EXTREMITY (HCC): ICD-10-CM

## 2018-02-01 DIAGNOSIS — L97.322 NON-PRESSURE CHRONIC ULCER OF LEFT ANKLE WITH FAT LAYER EXPOSED (HCC): Primary | ICD-10-CM

## 2018-02-01 DIAGNOSIS — I73.9 PERIPHERAL VASCULAR DISEASE, UNSPECIFIED (HCC): ICD-10-CM

## 2018-02-01 PROCEDURE — 29581 APPL MULTLAYER CMPRN SYS LEG: CPT

## 2018-02-01 NOTE — PROGRESS NOTES
Chief Complaint  This information was obtained from the patient  Patient presents for follow up of R ankle wound. Pt stated she is still having a little pain.      Allergies  Dexilant (Reaction: diarrhea)    HPI  This information was obtained from the patie Constitutional Symptoms (General Health):  Fatigue, Fever, Loss of Appetite, Marked Weight Change, Night Sweats, Chills  Respiratory: Cough, Shortness of Breath  Cardiovascular (Central/Peripheral): Chest Pain, Dyspnea on Exertion, Edema  Musculoskeletal: C Wound #3 Right, Medial Ankle is a Full Thickness Venous Ulcer and has received a status of Not Healed. Subsequent wound encounter measurements are 0.4cm length x 0.4cm width x 0.1cm depth, with an area of 0.16 sq cm and a volume of 0.016 cubic cm.  Ginger silva Change dressing every: - weekly    Additional Orders:    Compression Therapy:  Coflex 2 Layer - with stockinette under  Avoid prolonged standing in one place. Elevate leg(s)as much as possible. Follow-Up Appointments  Return Appointment in 1 week.     Rosa Sessions

## 2018-02-08 ENCOUNTER — OFFICE VISIT (OUTPATIENT)
Dept: WOUND CARE | Facility: HOSPITAL | Age: 83
End: 2018-02-08
Attending: NURSE PRACTITIONER
Payer: MEDICARE

## 2018-02-08 DIAGNOSIS — L97.322 NON-PRESSURE CHRONIC ULCER OF LEFT ANKLE WITH FAT LAYER EXPOSED (HCC): Primary | ICD-10-CM

## 2018-02-08 DIAGNOSIS — I87.332 CHRONIC VENOUS HYPERTENSION (IDIOPATHIC) WITH ULCER AND INFLAMMATION OF LEFT LOWER EXTREMITY (HCC): ICD-10-CM

## 2018-02-08 DIAGNOSIS — L97.929 CHRONIC VENOUS HYPERTENSION (IDIOPATHIC) WITH ULCER AND INFLAMMATION OF LEFT LOWER EXTREMITY (HCC): ICD-10-CM

## 2018-02-08 PROCEDURE — 17250 CHEM CAUT OF GRANLTJ TISSUE: CPT

## 2018-02-08 PROCEDURE — 29581 APPL MULTLAYER CMPRN SYS LEG: CPT

## 2018-02-08 NOTE — PROGRESS NOTES
Chief Complaint  This information was obtained from the patient  Patient presents for follow up of R ankle wound. Pt stated she is not having any pain today.     Allergies  Dexilant (Reaction: diarrhea)    HPI  This information was obtained from the patient Psychiatric: Nervousness / Tension  Prior Wound History: Pain (has taken tramadol today before appt)    Patient denies complaints or symptoms related to:   Constitutional Symptoms (General Health):  Fatigue, Fever, Loss of Appetite, Marked Weight Change, Ni Wound #3 Right, Medial Ankle is a Full Thickness Venous Ulcer and has received a status of Not Healed. Subsequent wound encounter measurements are 0.4cm length x 0.4cm width with no measurable depth, with an area of 0.16 sq cm . Hypergranulation was noted. May shower with protection. - After removing the compression wrap or total contact cast: cleanse the limb, foot, between toes with soap and water and dry thoroughly with each dressing/wrap change  Cleanse with saline or wound cleanser  Silver Foam  Change Pilar SIEGEL, ÁNGELA-AP, [facility], [address] [place] [address],[address], [facility] 02/08/2018 08:02:58        Entered By: Pilar Chaudhary on 02/08/2018 08:02:15

## 2018-02-15 ENCOUNTER — OFFICE VISIT (OUTPATIENT)
Dept: WOUND CARE | Facility: HOSPITAL | Age: 83
End: 2018-02-15
Attending: NURSE PRACTITIONER
Payer: MEDICARE

## 2018-02-15 DIAGNOSIS — I87.332 CHRONIC VENOUS HYPERTENSION (IDIOPATHIC) WITH ULCER AND INFLAMMATION OF LEFT LOWER EXTREMITY (HCC): ICD-10-CM

## 2018-02-15 DIAGNOSIS — L97.322 NON-PRESSURE CHRONIC ULCER OF LEFT ANKLE WITH FAT LAYER EXPOSED (HCC): Primary | ICD-10-CM

## 2018-02-15 DIAGNOSIS — L97.929 CHRONIC VENOUS HYPERTENSION (IDIOPATHIC) WITH ULCER AND INFLAMMATION OF LEFT LOWER EXTREMITY (HCC): ICD-10-CM

## 2018-02-15 DIAGNOSIS — I73.9 PERIPHERAL VASCULAR DISEASE, UNSPECIFIED (HCC): ICD-10-CM

## 2018-02-15 PROCEDURE — 29581 APPL MULTLAYER CMPRN SYS LEG: CPT

## 2018-02-15 NOTE — PROGRESS NOTES
Chief Complaint  This information was obtained from the patient  Patient is here for a follow up for the RLE. Patient states the back of her knee line was bothering her. She feels like the wrap might have been to tight.      Allergies  Dexilant (Reaction: d This information was obtained from the patient, caregiver  Patient complains of:   Cardiovascular (Central/Peripheral):  Other (hx of varicose veins, venous insufficiency)  Musculoskeletal: Joint Pain (osteoarthritis), Backache (kyphosis with neck pain)  In Gait and station stable. Integumentary (Hair, Skin)  see wound documentation. see wound documentation. Wound #3 Right, Medial Ankle is a Full Thickness Venous Ulcer and has received a status of Not Healed.  Subsequent wound encounter measurements ar May shower with protection. - After removing the compression wrap or total contact cast: cleanse the limb, foot, between toes with soap and water and dry thoroughly with each dressing/wrap change  Cleanse with saline or wound cleanser  Silver Foam  Moistur Geraldo SIEGEL, EYAL, CFRAUL, [facility] 02/15/2018 11:33:06        Entered By: Geraldo Johnson on 02/15/2018 11:32:42

## 2018-02-22 ENCOUNTER — APPOINTMENT (OUTPATIENT)
Dept: WOUND CARE | Facility: HOSPITAL | Age: 83
End: 2018-02-22
Attending: NURSE PRACTITIONER
Payer: MEDICARE

## 2018-03-01 ENCOUNTER — OFFICE VISIT (OUTPATIENT)
Dept: WOUND CARE | Facility: HOSPITAL | Age: 83
End: 2018-03-01
Attending: NURSE PRACTITIONER
Payer: MEDICARE

## 2018-03-01 DIAGNOSIS — L97.929 CHRONIC VENOUS HYPERTENSION (IDIOPATHIC) WITH ULCER AND INFLAMMATION OF LEFT LOWER EXTREMITY (HCC): ICD-10-CM

## 2018-03-01 DIAGNOSIS — L97.322 NON-PRESSURE CHRONIC ULCER OF LEFT ANKLE WITH FAT LAYER EXPOSED (HCC): Primary | ICD-10-CM

## 2018-03-01 DIAGNOSIS — I73.9 PERIPHERAL VASCULAR DISEASE, UNSPECIFIED (HCC): ICD-10-CM

## 2018-03-01 DIAGNOSIS — I87.332 CHRONIC VENOUS HYPERTENSION (IDIOPATHIC) WITH ULCER AND INFLAMMATION OF LEFT LOWER EXTREMITY (HCC): ICD-10-CM

## 2018-03-01 PROCEDURE — 29581 APPL MULTLAYER CMPRN SYS LEG: CPT

## 2018-03-08 ENCOUNTER — APPOINTMENT (OUTPATIENT)
Dept: WOUND CARE | Facility: HOSPITAL | Age: 83
End: 2018-03-08
Attending: NURSE PRACTITIONER
Payer: MEDICARE

## 2018-03-15 ENCOUNTER — OFFICE VISIT (OUTPATIENT)
Dept: WOUND CARE | Facility: HOSPITAL | Age: 83
End: 2018-03-15
Attending: NURSE PRACTITIONER
Payer: MEDICARE

## 2018-03-15 DIAGNOSIS — I87.332 CHRONIC VENOUS HYPERTENSION (IDIOPATHIC) WITH ULCER AND INFLAMMATION OF LEFT LOWER EXTREMITY (HCC): ICD-10-CM

## 2018-03-15 DIAGNOSIS — L97.929 CHRONIC VENOUS HYPERTENSION (IDIOPATHIC) WITH ULCER AND INFLAMMATION OF LEFT LOWER EXTREMITY (HCC): ICD-10-CM

## 2018-03-15 DIAGNOSIS — L97.322 NON-PRESSURE CHRONIC ULCER OF LEFT ANKLE WITH FAT LAYER EXPOSED (HCC): Primary | ICD-10-CM

## 2018-03-15 DIAGNOSIS — I73.9 PERIPHERAL VASCULAR DISEASE, UNSPECIFIED (HCC): ICD-10-CM

## 2018-03-15 PROCEDURE — 99213 OFFICE O/P EST LOW 20 MIN: CPT

## 2018-03-15 NOTE — PROGRESS NOTES
Chief Complaint  This information was obtained from the patient  Patient is here for a f/u to right ankle. Pt denies any pain or problems with wrap.      Allergies  Dexilant (Reaction: diarrhea)    HPI  This information was obtained from the patient, heike Musculoskeletal: Joint Pain (osteoarthritis), Backache (kyphosis with neck pain)  Integumentary (Hair/Skin/Nails): Hemosiderin Staining  Psychiatric: Nervousness / Tension  Prior Wound History: Pain (no c/o pain today)    Patient denies complaints or sympt Wound #3 Right, Medial Ankle is a Full Thickness Venous Ulcer and has received an outcome of Resolved. Hypergranulation was noted. There was no drainage noted. The patient reports a wound pain of level 0/10. The wound margin is flat and intact.  Wound bed h discussed plan for cleansing, moisturization and donning of compression stocking with patient and daughter.   Electronic Signature(s)   Signed By:  Date:    Kristi SIEGEL, ÁNGELA-AP, [facility], [address] [place] [address],[address], [facility] 03/15/2018 16:58:33        Entered By: Kristi Lopez

## 2018-03-22 ENCOUNTER — APPOINTMENT (OUTPATIENT)
Dept: WOUND CARE | Facility: HOSPITAL | Age: 83
End: 2018-03-22
Attending: NURSE PRACTITIONER
Payer: MEDICARE

## 2018-03-29 ENCOUNTER — APPOINTMENT (OUTPATIENT)
Dept: WOUND CARE | Facility: HOSPITAL | Age: 83
End: 2018-03-29
Attending: NURSE PRACTITIONER
Payer: MEDICARE

## 2018-05-22 PROBLEM — S22.20XA CLOSED FRACTURE OF STERNUM, UNSPECIFIED PORTION OF STERNUM, INITIAL ENCOUNTER: Status: ACTIVE | Noted: 2018-05-22

## 2018-05-22 PROCEDURE — 81001 URINALYSIS AUTO W/SCOPE: CPT | Performed by: FAMILY MEDICINE

## 2018-05-22 PROCEDURE — 87086 URINE CULTURE/COLONY COUNT: CPT | Performed by: FAMILY MEDICINE

## 2021-01-01 ENCOUNTER — APPOINTMENT (OUTPATIENT)
Dept: GENERAL RADIOLOGY | Facility: HOSPITAL | Age: 86
End: 2021-01-01
Attending: EMERGENCY MEDICINE
Payer: MEDICARE

## 2021-01-01 ENCOUNTER — HOSPITAL ENCOUNTER (EMERGENCY)
Facility: HOSPITAL | Age: 86
Discharge: HOME | End: 2021-01-01
Attending: EMERGENCY MEDICINE
Payer: MEDICARE

## 2021-01-01 VITALS
DIASTOLIC BLOOD PRESSURE: 64 MMHG | TEMPERATURE: 98 F | HEART RATE: 59 BPM | SYSTOLIC BLOOD PRESSURE: 117 MMHG | OXYGEN SATURATION: 100 % | RESPIRATION RATE: 17 BRPM

## 2021-01-01 DIAGNOSIS — Z23 NEED FOR VACCINATION: ICD-10-CM

## 2021-01-01 DIAGNOSIS — S32.592A CLOSED FRACTURE OF RAMUS OF LEFT PUBIS, INITIAL ENCOUNTER (HCC): Primary | ICD-10-CM

## 2021-01-01 DIAGNOSIS — R29.6 UNWITNESSED FALL: ICD-10-CM

## 2021-01-01 DIAGNOSIS — S43.002A SUBLUXATION OF LEFT SHOULDER JOINT, INITIAL ENCOUNTER: ICD-10-CM

## 2021-01-01 LAB
ALBUMIN SERPL-MCNC: 3.3 G/DL (ref 3.4–5)
ALBUMIN/GLOB SERPL: 0.8 {RATIO} (ref 1–2)
ALP LIVER SERPL-CCNC: 82 U/L
ALT SERPL-CCNC: 19 U/L
ANION GAP SERPL CALC-SCNC: 6 MMOL/L (ref 0–18)
AST SERPL-CCNC: 23 U/L (ref 15–37)
ATRIAL RATE: 63 BPM
BASOPHILS # BLD AUTO: 0.09 X10(3) UL (ref 0–0.2)
BASOPHILS NFR BLD AUTO: 0.7 %
BILIRUB SERPL-MCNC: 0.4 MG/DL (ref 0.1–2)
BILIRUB UR QL STRIP.AUTO: NEGATIVE
BUN BLD-MCNC: 34 MG/DL (ref 7–18)
BUN/CREAT SERPL: 22.1 (ref 10–20)
CALCIUM BLD-MCNC: 9.3 MG/DL (ref 8.5–10.1)
CHLORIDE SERPL-SCNC: 109 MMOL/L (ref 98–112)
CO2 SERPL-SCNC: 27 MMOL/L (ref 21–32)
COLOR UR AUTO: YELLOW
CREAT BLD-MCNC: 1.54 MG/DL
DEPRECATED RDW RBC AUTO: 50.7 FL (ref 35.1–46.3)
EOSINOPHIL # BLD AUTO: 0.09 X10(3) UL (ref 0–0.7)
EOSINOPHIL NFR BLD AUTO: 0.7 %
ERYTHROCYTE [DISTWIDTH] IN BLOOD BY AUTOMATED COUNT: 13.8 % (ref 11–15)
GLOBULIN PLAS-MCNC: 4 G/DL (ref 2.8–4.4)
GLUCOSE BLD-MCNC: 91 MG/DL (ref 70–99)
GLUCOSE UR STRIP.AUTO-MCNC: NEGATIVE MG/DL
HCT VFR BLD AUTO: 32.2 %
HGB BLD-MCNC: 10.7 G/DL
IMM GRANULOCYTES # BLD AUTO: 0.38 X10(3) UL (ref 0–1)
IMM GRANULOCYTES NFR BLD: 2.9 %
KETONES UR STRIP.AUTO-MCNC: NEGATIVE MG/DL
LYMPHOCYTES # BLD AUTO: 1.97 X10(3) UL (ref 1–4)
LYMPHOCYTES NFR BLD AUTO: 15.2 %
M PROTEIN MFR SERPL ELPH: 7.3 G/DL (ref 6.4–8.2)
MCH RBC QN AUTO: 33.4 PG (ref 26–34)
MCHC RBC AUTO-ENTMCNC: 33.2 G/DL (ref 31–37)
MCV RBC AUTO: 100.6 FL
MONOCYTES # BLD AUTO: 1.14 X10(3) UL (ref 0.1–1)
MONOCYTES NFR BLD AUTO: 8.8 %
NEUTROPHILS # BLD AUTO: 9.32 X10 (3) UL (ref 1.5–7.7)
NEUTROPHILS # BLD AUTO: 9.32 X10(3) UL (ref 1.5–7.7)
NEUTROPHILS NFR BLD AUTO: 71.7 %
NITRITE UR QL STRIP.AUTO: NEGATIVE
OSMOLALITY SERPL CALC.SUM OF ELEC: 301 MOSM/KG (ref 275–295)
P AXIS: 73 DEGREES
P-R INTERVAL: 238 MS
PH UR STRIP.AUTO: 5 [PH] (ref 5–8)
PLATELET # BLD AUTO: 253 10(3)UL (ref 150–450)
POTASSIUM SERPL-SCNC: 4.1 MMOL/L (ref 3.5–5.1)
PROT UR STRIP.AUTO-MCNC: NEGATIVE MG/DL
Q-T INTERVAL: 456 MS
QRS DURATION: 74 MS
QTC CALCULATION (BEZET): 466 MS
R AXIS: -51 DEGREES
RBC # BLD AUTO: 3.2 X10(6)UL
RBC #/AREA URNS AUTO: >10 /HPF
RBC UR QL AUTO: NEGATIVE
SARS-COV-2 RNA RESP QL NAA+PROBE: NOT DETECTED
SODIUM SERPL-SCNC: 142 MMOL/L (ref 136–145)
SP GR UR STRIP.AUTO: 1.02 (ref 1–1.03)
T AXIS: 51 DEGREES
TROPONIN I SERPL-MCNC: <0.045 NG/ML (ref ?–0.04)
UROBILINOGEN UR STRIP.AUTO-MCNC: <2 MG/DL
VENTRICULAR RATE: 63 BPM
WBC # BLD AUTO: 13 X10(3) UL (ref 4–11)

## 2021-01-01 PROCEDURE — 99285 EMERGENCY DEPT VISIT HI MDM: CPT | Performed by: EMERGENCY MEDICINE

## 2021-01-01 PROCEDURE — 85025 COMPLETE CBC W/AUTO DIFF WBC: CPT | Performed by: EMERGENCY MEDICINE

## 2021-01-01 PROCEDURE — 71045 X-RAY EXAM CHEST 1 VIEW: CPT | Performed by: EMERGENCY MEDICINE

## 2021-01-01 PROCEDURE — 73030 X-RAY EXAM OF SHOULDER: CPT | Performed by: EMERGENCY MEDICINE

## 2021-01-01 PROCEDURE — 96360 HYDRATION IV INFUSION INIT: CPT | Performed by: EMERGENCY MEDICINE

## 2021-01-01 PROCEDURE — 93005 ELECTROCARDIOGRAM TRACING: CPT

## 2021-01-01 PROCEDURE — 96361 HYDRATE IV INFUSION ADD-ON: CPT | Performed by: EMERGENCY MEDICINE

## 2021-01-01 PROCEDURE — 87086 URINE CULTURE/COLONY COUNT: CPT | Performed by: EMERGENCY MEDICINE

## 2021-01-01 PROCEDURE — 73502 X-RAY EXAM HIP UNI 2-3 VIEWS: CPT | Performed by: EMERGENCY MEDICINE

## 2021-01-01 PROCEDURE — 80053 COMPREHEN METABOLIC PANEL: CPT | Performed by: EMERGENCY MEDICINE

## 2021-01-01 PROCEDURE — 93010 ELECTROCARDIOGRAM REPORT: CPT | Performed by: EMERGENCY MEDICINE

## 2021-01-01 PROCEDURE — 84484 ASSAY OF TROPONIN QUANT: CPT | Performed by: EMERGENCY MEDICINE

## 2021-01-01 PROCEDURE — 81001 URINALYSIS AUTO W/SCOPE: CPT | Performed by: EMERGENCY MEDICINE

## 2021-01-01 RX ORDER — CALCIUM CARBONATE 200(500)MG
1 TABLET,CHEWABLE ORAL 2 TIMES DAILY
COMMUNITY

## 2021-01-01 RX ORDER — ACETAMINOPHEN 160 MG/5ML
325 SUSPENSION ORAL EVERY 6 HOURS PRN
COMMUNITY

## 2021-01-01 RX ORDER — DONEPEZIL HYDROCHLORIDE 10 MG/1
10 TABLET, FILM COATED ORAL NIGHTLY
COMMUNITY
Start: 2021-01-01

## 2021-01-01 RX ORDER — CARBOXYMETHYLCELLULOSE SODIUM 10 MG/ML
2 SOLUTION/ DROPS OPHTHALMIC 2 TIMES DAILY
COMMUNITY

## 2021-01-01 RX ORDER — SODIUM CHLORIDE 9 MG/ML
INJECTION, SOLUTION INTRAVENOUS CONTINUOUS
Status: DISCONTINUED | OUTPATIENT
Start: 2021-01-01 | End: 2021-01-01

## 2021-01-01 RX ORDER — MORPHINE SULFATE 2 MG/ML
2 INJECTION, SOLUTION INTRAMUSCULAR; INTRAVENOUS EVERY 30 MIN PRN
Status: DISCONTINUED | OUTPATIENT
Start: 2021-01-01 | End: 2021-01-01

## 2021-03-11 PROBLEM — S32.592A CLOSED FRACTURE OF RAMUS OF LEFT PUBIS (HCC): Status: ACTIVE | Noted: 2021-01-01

## 2021-03-11 NOTE — ED PROVIDER NOTES
Patient Seen in: [facility] Emergency Department      History   Patient presents with:  Fall    Stated Complaint: unwitnessed fall     HPI/Subjective:   HPI    72-year-old female who is alert and oriented x1 at baseline and resides at [facility] pr Vitals   BP 03/11/21 1216 (!) 167/99   Pulse 03/11/21 1216 73   Resp 03/11/21 1216 19   Temp 03/11/21 1220 97.5 °F (36.4 °C)   Temp src --    SpO2 03/11/21 1216 99 %   O2 Device 03/11/21 1216 None (Room air)       Current:/82   Pulse 72   Temp 97.5 ° RAPID SARS-COV-2 BY PCR - Normal   CBC WITH DIFFERENTIAL WITH PLATELET    Narrative: The following orders were created for panel order CBC WITH DIFFERENTIAL WITH PLATELET.   Procedure                               Abnormality         Status CHEST AP PORTABLE  (CPT=71045)    Result Date: 3/11/2021  PROCEDURE:  XR CHEST AP PORTABLE  (CPT=71045)  TECHNIQUE:  AP chest radiograph was obtained. COMPARISON:  None.   INDICATIONS:  unwitnessed fall  PATIENT STATED HISTORY: (As transcribed by [employer] her extreme kyphosis, unclear baseline        Spoke with Dr Juan David Smith. She will admit, request med telemetry. Ortho paged as well    Spoke with Dr Ruiz Corral. Suspect anterior subluxation is chronic related to osteoarthritis. Nothing to do.   Recommends weight

## 2021-03-11 NOTE — ED INITIAL ASSESSMENT (HPI)
Pt in per ems from [facility] after unwitnessed fall. Pt A&O x1 baseline. Pt arrives laying on left side, EMS reports pt states \"OW when left shoulder and hip touched. Arrives on 4L [facility] reports O2 sat 90% on RA.

## 2021-03-11 NOTE — CM/SW NOTE
Received a call from Ortho Spine CM/MODE inquiring why patient is being admitted as Ortho MD states WBAT. Patient is from [facility]. Talked to Dr. Tio King who is in agreement with patient being discharged back to [facility].   Updated floor MODE/ANTONINA, RN, MD.

## 2021-03-11 NOTE — ED NOTES
Updated daughter at this time that pt will not be admitted and will go back to [facility], states understanding

## 2021-03-23 ENCOUNTER — TELEPHONE (OUTPATIENT)
Dept: CASE MANAGEMENT | Age: 86
End: 2021-03-23

## (undated) NOTE — MR AVS SNAPSHOT
[facility] [place] [address] [place] [address]  [phone]               Thank you for choosing us for your health care visit with [facility].   We are glad to serve you and happy to provide you with this summary of yo Generic drug:  Levothyroxine Sodium   Take 1 tablet (50 mcg total) by mouth daily.            Vitamin D3 1000 units Tabs   1 TABLET DAILY   Commonly known as:  VITAMIN D3                   MyChart                  Visit [facility] at  htt

## (undated) NOTE — MR AVS SNAPSHOT
[facility]  [place] [address] [place] [id number] [phone]               Thank you for choosing us for your health care visit with [facility].   We are glad to serve you and happy to provide you with this summary of yo Take 1 tablet (10 mEq total) by mouth 2 (two) times daily. Commonly known as:  K-DUR           * SYNTHROID 50 MCG Tabs   Generic drug:  Levothyroxine Sodium   Take 1 tablet (50 mcg total) by mouth daily.            * SYNTHROID 50 MCG Tabs   Generic drug:

## (undated) NOTE — MR AVS SNAPSHOT
[facility]  [place] [address] [place] [address]  [phone]               Thank you for choosing us for your health care visit with [facility].   We are glad to serve you and happy to provide you with this summary of yo Visit [facility] online at  [email or web address]

## (undated) NOTE — MR AVS SNAPSHOT
[facility]  [place] [address] [place] [address]  [phone]               Thank you for choosing us for your health care visit with [facility].   We are glad to serve you and happy to provide you with this summary of yo medications prescribed for you. Read the directions carefully, and ask your doctor or other care provider to review them with you.             Eliezer                  Visit [facility] online at  [email or web address]

## (undated) NOTE — MR AVS SNAPSHOT
[facility]  [place] [address]  [address] [place] [address]  [phone]               Thank you for choosing us for your health care visit with [facility].   We are glad to serve you and happy to provide you with this summary of yo * Notice: This list has 4 medication(s) that are the same as other medications prescribed for you. Read the directions carefully, and ask your doctor or other care provider to review them with you.             Vickit                  Visit [facility]

## (undated) NOTE — MR AVS SNAPSHOT
[facility]  [place] [address] [place] [id number] [phone]               Thank you for choosing us for your health care visit with [facility].   We are glad to serve you and happy to provide you with this summary of yo Place 1 Application rectally 2 (two) times daily as needed for Hemorrhoids. Commonly known as:  ANUSOL-HC           Potassium Chloride ER 10 MEQ Tbcr   Take 1 tablet (10 mEq total) by mouth 2 (two) times daily.    Commonly known as:  K-DUR           [facility]

## (undated) NOTE — MR AVS SNAPSHOT
[facility]  [place] [address] [place] [address]  [phone]               Thank you for choosing us for your health care visit with [facility].   We are glad to serve you and happy to provide you with this summary of yo * Notice: This list has 4 medication(s) that are the same as other medications prescribed for you. Read the directions carefully, and ask your doctor or other care provider to review them with you.             Vickit                  Visit [facility]

## (undated) NOTE — MR AVS SNAPSHOT
[facility]  [place] [address] [place] [address]  [phone]               Thank you for choosing us for your health care visit with [facility].   We are glad to serve you and happy to provide you with this summary of yo * SYNTHROID 50 MCG Tabs   Generic drug:  Levothyroxine Sodium   Take 1 tablet (50 mcg total) by mouth once daily. Vitamin D3 1000 units Tabs   1 TABLET DAILY   Commonly known as:  VITAMIN D3           * Notice:   This list has 4 medication(s) óscar

## (undated) NOTE — MR AVS SNAPSHOT
[facility]  [place] [address] [place] [id number] [phone]               Thank you for choosing us for your health care visit with [facility].   We are glad to serve you and happy to provide you with this summary of yo Generic drug:  Levothyroxine Sodium   Take 1 tablet (50 mcg total) by mouth daily. Vitamin D3 1000 units Tabs   1 TABLET DAILY   Commonly known as:  VITAMIN D3           * Notice:   This list has 2 medication(s) that are the same as other medicati

## (undated) NOTE — MR AVS SNAPSHOT
[facility]  [place] [address]  [address] [place] [address]  [phone]               Thank you for choosing us for your health care visit with [facility].   We are glad to serve you and happy to provide you with this summary of yo Vitamin D3 1000 units Tabs   1 TABLET DAILY   Commonly known as:  VITAMIN D3                   MyChart                  Visit [facility] online at  [email or web address]

## (undated) NOTE — ED AVS SNAPSHOT
[facility] Emergency Department    [place]  [address] [place] [address]    Phone:  [phone]    Fax:  [phone]   MRN: [id number]    Department:  [facility] Emergency Department   Date of Visit:  2/23/20 IF THERE IS ANY CHANGE OR WORSENING OF YOUR CONDITION, CALL YOUR PRIMARY CARE PHYSICIAN AT ONCE OR RETURN IMMEDIATELY TO THE EMERGENCY DEPARTMENT.     If you have been prescribed any medication(s), please fill your prescription right away and begin taking t

## (undated) NOTE — MR AVS SNAPSHOT
\[facility]\""  [place] [address] [place] [id number] [phone]               Thank you for choosing us for your health care visit with [facility].   We are glad to serve you and happy to provide you with this summary of yo Place 1 Application rectally 2 (two) times daily as needed for Hemorrhoids. Commonly known as:  ANUSOL-HC           Potassium Chloride ER 10 MEQ Tbcr   Take 1 tablet (10 mEq total) by mouth 2 (two) times daily.    Commonly known as:  K-DUR           [facility]

## (undated) NOTE — MR AVS SNAPSHOT
[facility]  [place] [address] [place] [address]  [phone]               Thank you for choosing us for your health care visit with [facility].   We are glad to serve you and happy to provide you with this summary of yo Apply to rectal area twice daily as needed   Commonly known as:  PROCTOZONE-HC           * hydrocortisone 2.5 % Crea   Place 1 Application rectally 2 (two) times daily as needed for Hemorrhoids.    Commonly known as:  ANUSOL-HC           Potassium Chloride

## (undated) NOTE — MR AVS SNAPSHOT
[facility]  [place] [address] [address] [phone]               Thank you for choosing us for your health care visit with [facility].   We are glad to serve you and happy to provide you with this summary of yo Place 1 Application rectally 2 (two) times daily as needed for Hemorrhoids. Commonly known as:  ANUSOL-HC           Potassium Chloride ER 10 MEQ Tbcr   Take 1 tablet (10 mEq total) by mouth 2 (two) times daily.    Commonly known as:  K-DUR           [facility]

## (undated) NOTE — MR AVS SNAPSHOT
[facility]  [place] [address] [place] [id number] [phone]               Thank you for choosing us for your health care visit with [facility].   We are glad to serve you and happy to provide you with this summary of yo medications prescribed for you. Read the directions carefully, and ask your doctor or other care provider to review them with you.             Eliezer                  Visit [facility] online at  [email or web address]

## (undated) NOTE — MR AVS SNAPSHOT
[place] [id number]  [phone]               Thank you for choosing us for your health care visit with [facility].   We are glad to serve you and happy to provide you with this summary of yo [email or web address]

## (undated) NOTE — MR AVS SNAPSHOT
[facility] [place] [address] [place] [address]  [phone]               Thank you for choosing us for your health care visit with [facility].   We are glad to serve you and happy to provide you with this summary of yo Place 1 Application rectally 2 (two) times daily as needed for Hemorrhoids. Commonly known as:  ANUSOL-HC           Potassium Chloride ER 10 MEQ Tbcr   Take 1 tablet (10 mEq total) by mouth 2 (two) times daily.    Commonly known as:  K-DUR           [facility]

## (undated) NOTE — MR AVS SNAPSHOT
[facility] [address] [place] [address]  [phone]               Thank you for choosing us for your health care visit with [facility].   We are glad to serve you and happy to provide you with this summary of yo

## (undated) NOTE — MR AVS SNAPSHOT
[facility]  [place] [address] [place] [address]  [phone]               Thank you for choosing us for your health care visit with [facility].   We are glad to serve you and happy to provide you with this summary of yo Commonly known as:  ANUSOL-HC           Potassium Chloride ER 10 MEQ Tbcr   Take 1 tablet (10 mEq total) by mouth 2 (two) times daily.    Commonly known as:  K-DUR           SYNTHROID 50 MCG Tabs   Generic drug:  Levothyroxine Sodium   Take 1 tablet (50 mcg